# Patient Record
Sex: FEMALE | Race: WHITE | NOT HISPANIC OR LATINO | Employment: PART TIME | URBAN - METROPOLITAN AREA
[De-identification: names, ages, dates, MRNs, and addresses within clinical notes are randomized per-mention and may not be internally consistent; named-entity substitution may affect disease eponyms.]

---

## 2020-05-06 ENCOUNTER — TELEPHONE (OUTPATIENT)
Dept: SCHEDULING | Facility: IMAGING CENTER | Age: 60
End: 2020-05-06

## 2020-06-05 ENCOUNTER — TELEPHONE (OUTPATIENT)
Dept: MEDICAL GROUP | Facility: PHYSICIAN GROUP | Age: 60
End: 2020-06-05

## 2020-06-05 NOTE — TELEPHONE ENCOUNTER
Dr. Ely has informed me that we will need to reschedule patient appointment as she will need to see patient in person and not as virtual visit. I have called and left a message for patient to reschedule appointment for an in person visit.

## 2023-04-08 ENCOUNTER — APPOINTMENT (OUTPATIENT)
Dept: RADIOLOGY | Facility: MEDICAL CENTER | Age: 63
DRG: 640 | End: 2023-04-08
Attending: EMERGENCY MEDICINE
Payer: COMMERCIAL

## 2023-04-08 ENCOUNTER — HOSPITAL ENCOUNTER (INPATIENT)
Facility: MEDICAL CENTER | Age: 63
LOS: 5 days | DRG: 640 | End: 2023-04-13
Attending: EMERGENCY MEDICINE | Admitting: INTERNAL MEDICINE
Payer: COMMERCIAL

## 2023-04-08 DIAGNOSIS — M89.9 LYTIC LESION OF BONE ON X-RAY: ICD-10-CM

## 2023-04-08 DIAGNOSIS — S09.90XA CLOSED HEAD INJURY, INITIAL ENCOUNTER: ICD-10-CM

## 2023-04-08 DIAGNOSIS — E83.42 HYPOMAGNESEMIA: ICD-10-CM

## 2023-04-08 DIAGNOSIS — E87.1 HYPONATREMIA: ICD-10-CM

## 2023-04-08 DIAGNOSIS — M62.82 NON-TRAUMATIC RHABDOMYOLYSIS: ICD-10-CM

## 2023-04-08 DIAGNOSIS — G93.41 ENCEPHALOPATHY, METABOLIC: ICD-10-CM

## 2023-04-08 DIAGNOSIS — G72.9 MYOPATHY: ICD-10-CM

## 2023-04-08 DIAGNOSIS — E87.6 HYPOKALEMIA: ICD-10-CM

## 2023-04-08 DIAGNOSIS — F10.10 ALCOHOL ABUSE: ICD-10-CM
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
## 2023-04-08 PROBLEM — M89.8X9 LYTIC BONE LESIONS ON XRAY: Status: ACTIVE | Noted: 2023-04-08

## 2023-04-08 LAB
ALBUMIN SERPL BCP-MCNC: 4.3 G/DL (ref 3.2–4.9)
ALBUMIN/GLOB SERPL: 1.3 G/DL
ALP SERPL-CCNC: 92 U/L (ref 30–99)
ALT SERPL-CCNC: 64 U/L (ref 2–50)
ANION GAP SERPL CALC-SCNC: 15 MMOL/L (ref 7–16)
ANION GAP SERPL CALC-SCNC: 18 MMOL/L (ref 7–16)
ANION GAP SERPL CALC-SCNC: 19 MMOL/L (ref 7–16)
ANISOCYTOSIS BLD QL SMEAR: ABNORMAL
APPEARANCE UR: CLEAR
APTT PPP: 29.8 SEC (ref 24.7–36)
AST SERPL-CCNC: 203 U/L (ref 12–45)
BACTERIA #/AREA URNS HPF: NEGATIVE /HPF
BASOPHILS # BLD AUTO: 0 % (ref 0–1.8)
BASOPHILS # BLD: 0 K/UL (ref 0–0.12)
BILIRUB SERPL-MCNC: 1.2 MG/DL (ref 0.1–1.5)
BILIRUB UR QL STRIP.AUTO: NEGATIVE
BUN SERPL-MCNC: 14 MG/DL (ref 8–22)
BUN SERPL-MCNC: 18 MG/DL (ref 8–22)
BUN SERPL-MCNC: 19 MG/DL (ref 8–22)
CALCIUM ALBUM COR SERPL-MCNC: 9.4 MG/DL (ref 8.5–10.5)
CALCIUM SERPL-MCNC: 9 MG/DL (ref 8.5–10.5)
CALCIUM SERPL-MCNC: 9.1 MG/DL (ref 8.5–10.5)
CALCIUM SERPL-MCNC: 9.6 MG/DL (ref 8.5–10.5)
CHLORIDE SERPL-SCNC: 62 MMOL/L (ref 96–112)
CHLORIDE SERPL-SCNC: 64 MMOL/L (ref 96–112)
CHLORIDE SERPL-SCNC: <60 MMOL/L (ref 96–112)
CK SERPL-CCNC: 5637 U/L (ref 0–154)
CK SERPL-CCNC: 6875 U/L (ref 0–154)
CO2 SERPL-SCNC: 21 MMOL/L (ref 20–33)
CO2 SERPL-SCNC: 24 MMOL/L (ref 20–33)
CO2 SERPL-SCNC: 24 MMOL/L (ref 20–33)
COLOR UR: YELLOW
CREAT SERPL-MCNC: 0.46 MG/DL (ref 0.5–1.4)
CREAT SERPL-MCNC: 0.61 MG/DL (ref 0.5–1.4)
CREAT SERPL-MCNC: 0.69 MG/DL (ref 0.5–1.4)
DOHLE BOD BLD QL SMEAR: NORMAL
EKG IMPRESSION: NORMAL
EOSINOPHIL # BLD AUTO: 0 K/UL (ref 0–0.51)
EOSINOPHIL NFR BLD: 0 % (ref 0–6.9)
EPI CELLS #/AREA URNS HPF: NEGATIVE /HPF
ERYTHROCYTE [DISTWIDTH] IN BLOOD BY AUTOMATED COUNT: 40.5 FL (ref 35.9–50)
ETHANOL BLD-MCNC: <10.1 MG/DL
GFR SERPLBLD CREATININE-BSD FMLA CKD-EPI: 101 ML/MIN/1.73 M 2
GFR SERPLBLD CREATININE-BSD FMLA CKD-EPI: 108 ML/MIN/1.73 M 2
GFR SERPLBLD CREATININE-BSD FMLA CKD-EPI: 98 ML/MIN/1.73 M 2
GLOBULIN SER CALC-MCNC: 3.4 G/DL (ref 1.9–3.5)
GLUCOSE SERPL-MCNC: 109 MG/DL (ref 65–99)
GLUCOSE SERPL-MCNC: 147 MG/DL (ref 65–99)
GLUCOSE SERPL-MCNC: 95 MG/DL (ref 65–99)
GLUCOSE UR STRIP.AUTO-MCNC: NEGATIVE MG/DL
HCT VFR BLD AUTO: 33.5 % (ref 37–47)
HGB BLD-MCNC: 12.5 G/DL (ref 12–16)
HYALINE CASTS #/AREA URNS LPF: ABNORMAL /LPF
INR PPP: 1.02 (ref 0.87–1.13)
KETONES UR STRIP.AUTO-MCNC: 15 MG/DL
LEUKOCYTE ESTERASE UR QL STRIP.AUTO: NEGATIVE
LG PLATELETS BLD QL SMEAR: NORMAL
LYMPHOCYTES # BLD AUTO: 1.23 K/UL (ref 1–4.8)
LYMPHOCYTES NFR BLD: 8.4 % (ref 22–41)
MAGNESIUM SERPL-MCNC: 1.4 MG/DL (ref 1.5–2.5)
MANUAL DIFF BLD: NORMAL
MCH RBC QN AUTO: 32.5 PG (ref 27–33)
MCHC RBC AUTO-ENTMCNC: 37.3 G/DL (ref 33.6–35)
MCV RBC AUTO: 87 FL (ref 81.4–97.8)
MICRO URNS: ABNORMAL
MICROCYTES BLD QL SMEAR: ABNORMAL
MONOCYTES # BLD AUTO: 1.12 K/UL (ref 0–0.85)
MONOCYTES NFR BLD AUTO: 7.6 % (ref 0–13.4)
MORPHOLOGY BLD-IMP: NORMAL
NEUTROPHILS # BLD AUTO: 12.35 K/UL (ref 2–7.15)
NEUTROPHILS NFR BLD: 84 % (ref 44–72)
NITRITE UR QL STRIP.AUTO: NEGATIVE
NRBC # BLD AUTO: 0 K/UL
NRBC BLD-RTO: 0 /100 WBC
PH UR STRIP.AUTO: 6.5 [PH] (ref 5–8)
PLATELET # BLD AUTO: 283 K/UL (ref 164–446)
PLATELET BLD QL SMEAR: NORMAL
PMV BLD AUTO: 9.7 FL (ref 9–12.9)
POLYCHROMASIA BLD QL SMEAR: NORMAL
POTASSIUM SERPL-SCNC: 2.6 MMOL/L (ref 3.6–5.5)
POTASSIUM SERPL-SCNC: 2.7 MMOL/L (ref 3.6–5.5)
POTASSIUM SERPL-SCNC: 3.4 MMOL/L (ref 3.6–5.5)
PROT SERPL-MCNC: 7.7 G/DL (ref 6–8.2)
PROT UR QL STRIP: 100 MG/DL
PROTHROMBIN TIME: 13.3 SEC (ref 12–14.6)
RBC # BLD AUTO: 3.85 M/UL (ref 4.2–5.4)
RBC # URNS HPF: ABNORMAL /HPF
RBC BLD AUTO: PRESENT
RBC UR QL AUTO: NEGATIVE
SODIUM SERPL-SCNC: 101 MMOL/L (ref 135–145)
SODIUM SERPL-SCNC: 102 MMOL/L (ref 135–145)
SODIUM SERPL-SCNC: 104 MMOL/L (ref 135–145)
SODIUM SERPL-SCNC: 104 MMOL/L (ref 135–145)
SODIUM UR-SCNC: 26 MMOL/L
SP GR UR STRIP.AUTO: 1.02
TOXIC GRANULES BLD QL SMEAR: SLIGHT
UROBILINOGEN UR STRIP.AUTO-MCNC: 1 MG/DL
WBC # BLD AUTO: 14.7 K/UL (ref 4.8–10.8)
WBC #/AREA URNS HPF: ABNORMAL /HPF

## 2023-04-08 PROCEDURE — 85730 THROMBOPLASTIN TIME PARTIAL: CPT

## 2023-04-08 PROCEDURE — 84295 ASSAY OF SERUM SODIUM: CPT

## 2023-04-08 PROCEDURE — 82077 ASSAY SPEC XCP UR&BREATH IA: CPT

## 2023-04-08 PROCEDURE — 99291 CRITICAL CARE FIRST HOUR: CPT | Mod: GC | Performed by: INTERNAL MEDICINE

## 2023-04-08 PROCEDURE — 700102 HCHG RX REV CODE 250 W/ 637 OVERRIDE(OP): Performed by: NURSE PRACTITIONER

## 2023-04-08 PROCEDURE — 700102 HCHG RX REV CODE 250 W/ 637 OVERRIDE(OP): Performed by: INTERNAL MEDICINE

## 2023-04-08 PROCEDURE — 82550 ASSAY OF CK (CPK): CPT | Mod: 91

## 2023-04-08 PROCEDURE — 85025 COMPLETE CBC W/AUTO DIFF WBC: CPT

## 2023-04-08 PROCEDURE — 83735 ASSAY OF MAGNESIUM: CPT

## 2023-04-08 PROCEDURE — 80048 BASIC METABOLIC PNL TOTAL CA: CPT | Mod: 91

## 2023-04-08 PROCEDURE — A9270 NON-COVERED ITEM OR SERVICE: HCPCS | Performed by: STUDENT IN AN ORGANIZED HEALTH CARE EDUCATION/TRAINING PROGRAM

## 2023-04-08 PROCEDURE — 84300 ASSAY OF URINE SODIUM: CPT

## 2023-04-08 PROCEDURE — 700105 HCHG RX REV CODE 258: Performed by: INTERNAL MEDICINE

## 2023-04-08 PROCEDURE — 85610 PROTHROMBIN TIME: CPT

## 2023-04-08 PROCEDURE — 99291 CRITICAL CARE FIRST HOUR: CPT

## 2023-04-08 PROCEDURE — A9270 NON-COVERED ITEM OR SERVICE: HCPCS | Performed by: INTERNAL MEDICINE

## 2023-04-08 PROCEDURE — 770022 HCHG ROOM/CARE - ICU (200)

## 2023-04-08 PROCEDURE — 83930 ASSAY OF BLOOD OSMOLALITY: CPT

## 2023-04-08 PROCEDURE — 80053 COMPREHEN METABOLIC PANEL: CPT

## 2023-04-08 PROCEDURE — 700102 HCHG RX REV CODE 250 W/ 637 OVERRIDE(OP): Performed by: STUDENT IN AN ORGANIZED HEALTH CARE EDUCATION/TRAINING PROGRAM

## 2023-04-08 PROCEDURE — 72125 CT NECK SPINE W/O DYE: CPT

## 2023-04-08 PROCEDURE — A9270 NON-COVERED ITEM OR SERVICE: HCPCS | Performed by: NURSE PRACTITIONER

## 2023-04-08 PROCEDURE — 700101 HCHG RX REV CODE 250: Performed by: EMERGENCY MEDICINE

## 2023-04-08 PROCEDURE — 71045 X-RAY EXAM CHEST 1 VIEW: CPT

## 2023-04-08 PROCEDURE — 70450 CT HEAD/BRAIN W/O DYE: CPT

## 2023-04-08 PROCEDURE — 85007 BL SMEAR W/DIFF WBC COUNT: CPT

## 2023-04-08 PROCEDURE — 81001 URINALYSIS AUTO W/SCOPE: CPT

## 2023-04-08 PROCEDURE — 83935 ASSAY OF URINE OSMOLALITY: CPT

## 2023-04-08 PROCEDURE — 93005 ELECTROCARDIOGRAM TRACING: CPT | Performed by: STUDENT IN AN ORGANIZED HEALTH CARE EDUCATION/TRAINING PROGRAM

## 2023-04-08 PROCEDURE — 700111 HCHG RX REV CODE 636 W/ 250 OVERRIDE (IP): Performed by: STUDENT IN AN ORGANIZED HEALTH CARE EDUCATION/TRAINING PROGRAM

## 2023-04-08 RX ORDER — MAGNESIUM SULFATE HEPTAHYDRATE 40 MG/ML
4 INJECTION, SOLUTION INTRAVENOUS ONCE
Status: COMPLETED | OUTPATIENT
Start: 2023-04-08 | End: 2023-04-08

## 2023-04-08 RX ORDER — POTASSIUM CHLORIDE 7.45 MG/ML
10 INJECTION INTRAVENOUS
Status: COMPLETED | OUTPATIENT
Start: 2023-04-08 | End: 2023-04-08

## 2023-04-08 RX ORDER — AMOXICILLIN 250 MG
2 CAPSULE ORAL 2 TIMES DAILY
Status: DISCONTINUED | OUTPATIENT
Start: 2023-04-08 | End: 2023-04-13 | Stop reason: HOSPADM

## 2023-04-08 RX ORDER — ASPIRIN 325 MG
650 TABLET ORAL EVERY 6 HOURS PRN
COMMUNITY

## 2023-04-08 RX ORDER — DIPHENHYDRAMINE HCL 25 MG
25 TABLET ORAL NIGHTLY PRN
Status: DISCONTINUED | OUTPATIENT
Start: 2023-04-08 | End: 2023-04-09

## 2023-04-08 RX ORDER — POTASSIUM CHLORIDE 20 MEQ/1
20 TABLET, EXTENDED RELEASE ORAL ONCE
Status: COMPLETED | OUTPATIENT
Start: 2023-04-08 | End: 2023-04-08

## 2023-04-08 RX ORDER — ALBUTEROL SULFATE 90 UG/1
2 AEROSOL, METERED RESPIRATORY (INHALATION) EVERY 4 HOURS PRN
COMMUNITY

## 2023-04-08 RX ORDER — POLYETHYLENE GLYCOL 3350 17 G/17G
1 POWDER, FOR SOLUTION ORAL
Status: DISCONTINUED | OUTPATIENT
Start: 2023-04-08 | End: 2023-04-13 | Stop reason: HOSPADM

## 2023-04-08 RX ORDER — ACETAMINOPHEN 325 MG/1
650 TABLET ORAL EVERY 6 HOURS PRN
Status: DISCONTINUED | OUTPATIENT
Start: 2023-04-08 | End: 2023-04-13 | Stop reason: HOSPADM

## 2023-04-08 RX ORDER — SODIUM CHLORIDE, SODIUM LACTATE, POTASSIUM CHLORIDE, CALCIUM CHLORIDE 600; 310; 30; 20 MG/100ML; MG/100ML; MG/100ML; MG/100ML
INJECTION, SOLUTION INTRAVENOUS CONTINUOUS
Status: DISCONTINUED | OUTPATIENT
Start: 2023-04-08 | End: 2023-04-08

## 2023-04-08 RX ORDER — BUDESONIDE AND FORMOTEROL FUMARATE DIHYDRATE 160; 4.5 UG/1; UG/1
2 AEROSOL RESPIRATORY (INHALATION) 2 TIMES DAILY
COMMUNITY

## 2023-04-08 RX ORDER — POTASSIUM CHLORIDE 20 MEQ/1
40 TABLET, EXTENDED RELEASE ORAL ONCE
Status: COMPLETED | OUTPATIENT
Start: 2023-04-08 | End: 2023-04-08

## 2023-04-08 RX ORDER — ESCITALOPRAM OXALATE 20 MG/1
20 TABLET ORAL DAILY
COMMUNITY

## 2023-04-08 RX ORDER — SODIUM CHLORIDE 450 MG/100ML
INJECTION, SOLUTION INTRAVENOUS CONTINUOUS
Status: DISCONTINUED | OUTPATIENT
Start: 2023-04-08 | End: 2023-04-09

## 2023-04-08 RX ORDER — BISACODYL 10 MG
10 SUPPOSITORY, RECTAL RECTAL
Status: DISCONTINUED | OUTPATIENT
Start: 2023-04-08 | End: 2023-04-13 | Stop reason: HOSPADM

## 2023-04-08 RX ADMIN — POTASSIUM CHLORIDE 10 MEQ: 7.46 INJECTION, SOLUTION INTRAVENOUS at 18:40

## 2023-04-08 RX ADMIN — RIVAROXABAN 10 MG: 10 TABLET, FILM COATED ORAL at 17:26

## 2023-04-08 RX ADMIN — SODIUM CHLORIDE: 4.5 INJECTION, SOLUTION INTRAVENOUS at 17:29

## 2023-04-08 RX ADMIN — POTASSIUM CHLORIDE 40 MEQ: 1500 TABLET, EXTENDED RELEASE ORAL at 17:26

## 2023-04-08 RX ADMIN — POTASSIUM CHLORIDE 10 MEQ: 7.46 INJECTION, SOLUTION INTRAVENOUS at 17:37

## 2023-04-08 RX ADMIN — DIPHENHYDRAMINE HYDROCHLORIDE 25 MG: 25 TABLET ORAL at 20:55

## 2023-04-08 RX ADMIN — POTASSIUM CHLORIDE 20 MEQ: 1500 TABLET, EXTENDED RELEASE ORAL at 23:31

## 2023-04-08 RX ADMIN — MAGNESIUM SULFATE HEPTAHYDRATE 4 G: 40 INJECTION, SOLUTION INTRAVENOUS at 17:31

## 2023-04-08 RX ADMIN — POTASSIUM CHLORIDE 10 MEQ: 7.46 INJECTION, SOLUTION INTRAVENOUS at 20:49

## 2023-04-08 RX ADMIN — THIAMINE HYDROCHLORIDE: 100 INJECTION, SOLUTION INTRAMUSCULAR; INTRAVENOUS at 14:46

## 2023-04-08 RX ADMIN — POTASSIUM CHLORIDE 10 MEQ: 7.46 INJECTION, SOLUTION INTRAVENOUS at 19:47

## 2023-04-08 RX ADMIN — SENNOSIDES AND DOCUSATE SODIUM 2 TABLET: 50; 8.6 TABLET ORAL at 17:26

## 2023-04-08 ASSESSMENT — ENCOUNTER SYMPTOMS
BLURRED VISION: 0
LOSS OF CONSCIOUSNESS: 0
CHILLS: 0
PALPITATIONS: 0
NAUSEA: 0
HEADACHES: 0
NECK PAIN: 0
VOMITING: 0
SENSORY CHANGE: 0
FEVER: 0
FOCAL WEAKNESS: 0
SHORTNESS OF BREATH: 0
COUGH: 0
DIZZINESS: 0
ABDOMINAL PAIN: 0
SPEECH CHANGE: 0

## 2023-04-08 ASSESSMENT — LIFESTYLE VARIABLES: SUBSTANCE_ABUSE: 1

## 2023-04-08 NOTE — H&P
[facility] Internal Medicine History & Physical Note    Date of Service  4/8/2023    [facility] Team: [facility] ICU Gold Team   Attending: Sheryl Townsend D.o.  Senior Resident: Dr. Barnard    Contact Number: [phone]    Primary Care Physician  Pcp Pt States None    Consultants  critical care    Specialist Names: Dr. Townsend    Code Status  Full Code    Chief Complaint  Chief Complaint   Patient presents with    GLF       History of Presenting Illness (HPI):   Patient is a 62 y.o. female w/ PMH of L4-L5 fusion 2/2 DDD, bilateral total knee replacements, left femur fracture of unknown cause s/p intramedullary nailing, bilateral foot fractures of unknown cause s/p intramedullary nailing, who presented to the ED on 4/8/2023 with GLF.    Patient states she was trying to get out of bed at night, but fell and couldn't get up. She denied any prodromes before the fall and any LOC or head trauma after the fall. She states she fell on her back. She states she had problems moving due to her extensive past surgical history of L4-L5 spine fusion w/ diminished spine mobility, bilateral total knee replacements, and left femur fracture s/p intramedullary nailing, and bilateral foot fractures s/p intramedullary nailing. She was down for 2 days and could not get up so she called a friend who eventually called EMS. She denies any symptoms currently including dizziness, headache, blurry vision, muscle weakness, change in muscle sensation, chest pain, palpitations, SOB, cough, abdominal pain, nausea, and vomiting. She denies any past medical history, denies taking any medications, denies taking any NSAIDs nor blood thinners, denies any family history of cancers.    She denies ever smoking, drinks 2-3x alcoholic beverages per week, but hasn't drank for the past couple of weeks. Occasionally smokes Marijuana, but not daily. She lives alone and works part time as a  at [employer].    In the ED, VSS on RA  Na 102, K 2.7, Cl <60, Co2 24, AG 18  Cr  0.69, Mg 1.4, CPK 6875  CXR (4/8/23): Expansile lytic lesion in the right humeral shaft  CT head w/o (4/8/23): No acute cerebral infarction, hemorrhage or mass lesion.  CT spine w/o (4/8/23): Degenerative change without evidence of cervical spine fracture  S/p banana bag in the ED    I discussed the plan of care with patient, family, bedside RN, charge RN, and pharmacy.    Review of Systems  Review of Systems   Constitutional:  Negative for chills, fever and malaise/fatigue.   Eyes:  Negative for blurred vision.   Respiratory:  Negative for cough and shortness of breath.    Cardiovascular:  Negative for chest pain and palpitations.   Gastrointestinal:  Negative for abdominal pain, nausea and vomiting.   Genitourinary:  Negative for dysuria.   Musculoskeletal:  Negative for neck pain.   Skin:  Negative for rash.   Neurological:  Negative for dizziness, sensory change, speech change, focal weakness, loss of consciousness and headaches.   Psychiatric/Behavioral:  Positive for substance abuse.    All other systems reviewed and are negative.    Past Medical History  Denies any PMHx    Surgical History  L4-L5 fusion, bilateral knee replacements, intramedullary nailing of left femur, intramedullary nailing of bilateral foot    Family History  Denies any family hx of malignancy  Family history reviewed with patient.     Social History  Tobacco: Please see HPI  Alcohol: Please see HPI  Recreational drugs (illegal or prescription): Please see HPI  Employment: Please see HPI  Living Situation: Please see HPI  Recent Travel: Please see HPI  Primary Care Provider: Reviewed    Allergies  No Known Allergies    Medications  None       Physical Exam  Temp:  [36.7 °C (98 °F)-36.8 °C (98.2 °F)] 36.7 °C (98 °F)  Pulse:  [82-91] 91  Resp:  [18-20] 20  BP: (122-145)/(59-75) 122/75  SpO2:  [95 %-97 %] 95 %  Blood Pressure: 131/74   Temperature: 36.8 °C (98.2 °F)   Pulse: 89   Respiration: 18   Pulse Oximetry: 97 %       Physical  Exam  Vitals and nursing note reviewed.   Constitutional:       Appearance: Normal appearance.   HENT:      Head: Normocephalic and atraumatic.      Comments: Ecchymosis around forehead and left periorbital region     Right Ear: External ear normal.      Left Ear: External ear normal.      Nose: Nose normal.      Mouth/Throat:      Mouth: Mucous membranes are dry.      Pharynx: Oropharynx is clear.   Eyes:      Extraocular Movements: Extraocular movements intact.      Pupils: Pupils are equal, round, and reactive to light.   Cardiovascular:      Rate and Rhythm: Normal rate and regular rhythm.      Pulses: Normal pulses.      Heart sounds: Normal heart sounds. No murmur heard.    No friction rub. No gallop.   Pulmonary:      Effort: Pulmonary effort is normal. No respiratory distress.      Breath sounds: Normal breath sounds. No stridor. No wheezing, rhonchi or rales.   Abdominal:      General: There is no distension.      Palpations: Abdomen is soft.      Tenderness: There is no abdominal tenderness. There is no guarding or rebound.   Musculoskeletal:         General: Normal range of motion.      Cervical back: Normal range of motion.   Skin:     General: Skin is warm.      Capillary Refill: Capillary refill takes less than 2 seconds.      Coloration: Skin is not jaundiced.      Comments: Scattered ecchymosis in the bilateral LE   Neurological:      General: No focal deficit present.      Mental Status: She is alert and oriented to person, place, and time.      Cranial Nerves: No cranial nerve deficit.      Sensory: No sensory deficit.      Coordination: Coordination abnormal (mild dysmetria on bilateral UE, normal heel to shin).       Laboratory:      Recent Labs     04/08/23  1330 04/08/23  1559   SODIUM 102* 101*   POTASSIUM 2.7* 2.6*   CHLORIDE <60* 62*   CO2 24 24   GLUCOSE 109* 147*   BUN 19 18   CREATININE 0.69 0.61   CALCIUM 9.6 9.1     Recent Labs     04/08/23  1330 04/08/23  1559   ALTSGPT 64*  --     ASTSGOT 203*  --    ALKPHOSPHAT 92  --    TBILIRUBIN 1.2  --    GLUCOSE 109* 147*     Recent Labs     04/08/23  1330   APTT 29.8   INR 1.02     No results for input(s): NTPROBNP in the last 72 hours.      No results for input(s): TROPONINT in the last 72 hours.    Imaging:  CT-CSPINE WITHOUT PLUS RECONS   Final Result      Degenerative change without evidence of cervical spine fracture.      CT-HEAD W/O   Final Result      Head CT without contrast within normal limits. No evidence of acute cerebral infarction, hemorrhage or mass lesion.         DX-CHEST-PORTABLE (1 VIEW)   Final Result      1.  No acute cardiopulmonary disease.   2.  Expansile lytic lesion in the RIGHT humeral shaft, of uncertain etiology.          X-Ray:  I have personally reviewed the images and compared with prior images.  EKG:  I have personally reviewed the images and compared with prior images.    Assessment/Plan:  Problem Representation:   I anticipate this patient will require at least two midnights for appropriate medical management, necessitating inpatient admission because of management of severe hyponatremia    Patient will need a ICU (Adult and Pediatrics) bed on MEDICAL service .  The need is secondary to as above.    * Hyponatremia- (present on admission)  Assessment & Plan  *Na 102, K 2.7, Cl <60  *Serum Osmo 217  *Hypovolemic Hypotonic Hyponatremia    -Admit to ICU  -Telemetry monitoring  -Replete Na w/  ml/hr  -q4h Na+ labs, goal to correct NA 8-12 mEq/L in 24 hour period  -Serum osmolality,  urine osmolality, urine sodium to help determine etiology    Rhabdomyolysis  Assessment & Plan  *CPK 6875  *2/2 GLF w/ prolonged immobility and dehydration    -Continue w/ conservative IVF resuscitation,  ml/hr    Lytic bone lesions on xray  Assessment & Plan  *CXR (4/8/23): Expansile lytic lesion in the right humeral shaft  *Differentials include malignancy    -Consider Pan CT w/ contrast after resolution of  rhabdomyolysis    Ground-level fall  Assessment & Plan  *Denies LOC/head trauma  *CT head w/o (4/8/23): No acute cerebral infarction, hemorrhage or mass lesion.  CT spine w/o (4/8/23): Degenerative change without evidence of cervical spine fracture    -PT/OT    Hypomagnesemia  Assessment & Plan  *Mg 1.4    -Replete as needed    Hypokalemia  Assessment & Plan  *K 2.7    -Replete as needed        VTE prophylaxis: Xarelto 10 mg daily as prophylaxis

## 2023-04-08 NOTE — ED NOTES
Med rec updated and complete. Allergies reviewed. Confirmed name and date of birth. Pt denies antibiotic use in last 30 days.      Home pharmacy [facility] [phone]

## 2023-04-08 NOTE — ED NOTES
Anm from Lab called with critical result of Potasium 2.7,  and CPK 6875 at 1519. Critical lab result read back to Anm   Dr. Ye notified of critical lab result at 1520 via volt text

## 2023-04-08 NOTE — ED NOTES
Patient return back to [facility] from imaging, charge nurse requested for revising deposition for critical labs, advised to transfer pt to [facility]

## 2023-04-08 NOTE — ED NOTES
Patient report given to REID Covarrubias. Transferred to [facility] on [facility].

## 2023-04-08 NOTE — ED PROVIDER NOTES
"ED Provider Note    CHIEF COMPLAINT  Chief Complaint   Patient presents with    GLF     EXTERNAL RECORDS REVIEWED  No recent records available.    HPI/ROS  LIMITATION TO HISTORY   Select: : None  OUTSIDE HISTORIAN(S):  None    Farzana Fragoso is a 62 y.o. female who presents to the Emergency Department for evaluation after a fall onset two days ago. Patient reports that she was trying to get out of bed when she slipped, causing the fall. Patient states that she hit her head on the ground, but denies any loss of consciousness . She was unable to get up due to generalized weakness, and was reportedly lying on the floor for two days. Patient was finally able to get in touch with friend who contacted EMS, who brought her in today. She is now experiencing associated headache, but denies any neck pain, back pain, or extremity pain. Patient initially said that she wasn't on blood thinners, but later states that she takes aspirin regularly for pain with her last dose being this morning. Patient admits to drinking alcohol regularly with her last drink being a couple of weeks ago.     PAST MEDICAL HISTORY  None reported.    SURGICAL HISTORY  None pertinent.    FAMILY HISTORY  None noted at this encounter.    SOCIAL HISTORY       CURRENT MEDICATIONS  Current Discharge Medication List        CONTINUE these medications which have NOT CHANGED    Details   escitalopram (LEXAPRO) 20 MG tablet Take 20 mg by mouth every day.      aspirin (ASA) 325 MG Tab Take 650 mg by mouth every 6 hours as needed.      albuterol 108 (90 Base) MCG/ACT Aero Soln inhalation aerosol Inhale 2 Puffs every four hours as needed for Shortness of Breath.      budesonide-formoterol (SYMBICORT) 160-4.5 MCG/ACT Aerosol Inhale 2 Puffs 2 times a day.             ALLERGIES  Patient has no allergy information on record.    PHYSICAL EXAM  /74   Pulse 89   Temp 36.8 °C (98.2 °F) (Temporal)   Resp 18   Ht 1.707 m (5' 7.2\")   Wt 74.8 kg (165 lb)   SpO2 97%  "   Constitutional: Chronically ill appearing. Alert in no apparent distress.  HENT: Bilateral external ears normal. Nose normal.  Moist mucous membranes.  Oropharynx clear. Multiple bruises to forehead and around the left eye.  Eyes: Pupils are equal and reactive. Conjunctiva normal.   Neck: Supple, full range of motion  Heart: Regular rate and rhythm.  No murmurs.    Lungs: No respiratory distress, normal work of breathing. Lungs clear to auscultation bilaterally.  Abdomen Soft, no distention.  No tenderness to palpation.  Musculoskeletal: No obvious deformities noted.  No lower extremity edema. Scattered brusing on all extremities, primarily over right shoulder, right elbow, and bilateral knees.   Skin: Warm, Dry.  No erythema, No rash. Small skin tear over right elbow.  Neurologic: Alert and oriented x3. Moving all extremities spontaneously without focal deficits.  Psychiatric: Affect normal, Mood normal, Appears appropriate and not intoxicated.     DIAGNOSTIC STUDIES / PROCEDURES    LABS  Labs Reviewed   COMP METABOLIC PANEL - Abnormal; Notable for the following components:       Result Value    Sodium 102 (*)     Potassium 2.7 (*)     Chloride <60 (*)     Anion Gap 18.0 (*)     Glucose 109 (*)     AST(SGOT) 203 (*)     ALT(SGPT) 64 (*)     All other components within normal limits   CREATINE KINASE - Abnormal; Notable for the following components:    CPK Total 6875 (*)     All other components within normal limits   MAGNESIUM - Abnormal; Notable for the following components:    Magnesium 1.4 (*)     All other components within normal limits   URINALYSIS,CULTURE IF INDICATED - Abnormal; Notable for the following components:    Ketones 15 (*)     Protein 100 (*)     All other components within normal limits    Narrative:     Osmolality, Urine, test code [id number].  Transport: 0.5-1 mL Urine,  refrigerated.   BASIC METABOLIC PANEL - Abnormal; Notable for the following components:    Sodium 101 (*)     Potassium 2.6  (*)     Chloride 62 (*)     Glucose 147 (*)     All other components within normal limits   CBC WITH DIFFERENTIAL - Abnormal; Notable for the following components:    WBC 14.7 (*)     RBC 3.85 (*)     Hematocrit 33.5 (*)     MCHC 37.3 (*)     Neutrophils-Polys 84.00 (*)     Lymphocytes 8.40 (*)     Neutrophils (Absolute) 12.35 (*)     Monos (Absolute) 1.12 (*)     Microcytosis 2+ (*)     All other components within normal limits   URINE MICROSCOPIC (W/UA) - Abnormal; Notable for the following components:    Hyaline Cast 3-5 (*)     All other components within normal limits    Narrative:     Osmolality, Urine, test code [id number].  Transport: 0.5-1 mL Urine,  refrigerated.   DIAGNOSTIC ALCOHOL   PROTHROMBIN TIME   APTT   CORRECTED CALCIUM   ESTIMATED GFR   URINE SODIUM RANDOM    Narrative:     Osmolality, Urine, test code [id number].  Transport: 0.5-1 mL Urine,  refrigerated.   ESTIMATED GFR   DIFFERENTIAL MANUAL   PERIPHERAL SMEAR REVIEW   PLATELET ESTIMATE   MORPHOLOGY   BASIC METABOLIC PANEL   REFERENCE MISC.REFRIG 1    Narrative:     Osmolality, Serum, test code [id number].  Transport: 0.5-1 mL serum,  refrigerated.   TSH WITH REFLEX TO FT4   REFERENCE MISC.REFRIG 1    Narrative:     Osmolality, Urine, test code [id number].  Transport: 0.5-1 mL Urine,  refrigerated.   SODIUM SERUM (NA)   CREATINE KINASE   CREATINE KINASE   BASIC METABOLIC PANEL   SODIUM SERUM (NA)        RADIOLOGY  I have independently interpreted the diagnostic imaging associated with this visit and am waiting the final reading from the radiologist.   My preliminary interpretation is a follows: no intracranial hemorrhage  Radiologist interpretation:   CT-CSPINE WITHOUT PLUS RECONS   Final Result      Degenerative change without evidence of cervical spine fracture.      CT-HEAD W/O   Final Result      Head CT without contrast within normal limits. No evidence of acute cerebral infarction, hemorrhage or mass lesion.         DX-CHEST-PORTABLE (1 VIEW)   Final  Result      1.  No acute cardiopulmonary disease.   2.  Expansile lytic lesion in the RIGHT humeral shaft, of uncertain etiology.           COURSE & MEDICAL DECISION MAKING  1:57 PM - Patient seen and examined at bedside. Discussed plan of care, including ordering lab work and imaging. Patient agrees to the plan of care. The patient will be medicated with Detox IV 1000 mL. Ordered for Corrected calcium, APTT, prothrombin time, Diagnostic alcohol, UA, Magnesium, Creatine kinase, CMP, CBC with differential, CT-Head, DX-Chest, and CT-C spine without plus recons to evaluate her symptoms.      ED Observation Status? Yes; I am placing the patient in to an observation status due to a diagnostic uncertainty as well as therapeutic intensity. Patient placed in observation status at 1:57 PM, 4/8/2023.     Observation plan is as follows: Reevaluate patient after lab work and imaging is completed.    Upon Reevaluation, the patient's condition has: not improved; and will be escalated to hospitalization.    INITIAL ASSESSMENT, COURSE AND PLAN  Care Narrative: Patient with no reported medical history although does appear to use alcohol regularly who presents after a ground-level fall which occurred 2 days ago.  The patient was too weak to get up and has been lying on the floor since that time.  She is alert with normal vital signs on arrival.  She has no focal neurologic deficits concerning for stroke.  She does have multiple areas of bruising on the head and body that would suggest that she has been falling frequently.  Imaging does not show evidence of intracranial hemorrhage, skull fracture, cervical spine fracture.  Patient has severe electrolyte derangements with a sodium of 102, potassium of 2.7 as well as low magnesium.  She received approximately 300 cc of a detox bag prior to these results returning.  Fluids were stopped and labs were rechecked, showing sodium actually decreased to 101.  Patient remains neurologically  intact.  We will plan to admit to the ICU for close monitoring and electrolyte repletion.  Urine lites are pending at this time.  She also has evidence of mild rhabdomyolysis with a CK of 6000 and will need to be hydrated for this as well.  She has mild leukocytosis however no signs of sepsis or infectious process, urinalysis negative for infection.  Chest x-ray without pneumonia or pulmonary edema.  She does have a lytic lesion of the right humerus which likely needs further evaluation as well.    4:54 PM - Upon reassessment, patient is resting comfortably with normal vital signs.  No new complaints at this time.  Discussed admission with the patient. Patient verbalizes understanding and agreement to this plan of care.      ADDITIONAL PROBLEM LIST  Problem #1: Severe hyponatremia -given a small bolus of IV fluids approximately 300 cc prior to labs returning, repeat showed slight decrease, will plan to admit for further slow repletion    Problem #2: Acute rhabdomyolysis - admit for hydration and monitoring    Problem #3: Hypokalemia -admit for repletion    Problem #4: Hypomagnesemia -admit for repletion    Problem #5: Lytic lesion of the left shoulder - likely needs further evaluation with CT of the chest abdomen pelvis as an inpatient    Problem #6: Transaminitis - likely related to alcohol use    DISPOSITION AND DISCUSSIONS  I have discussed management of the patient with the following physicians and KRISTEN's:  Dr. Townsend    3:37 PM I discussed the patient's case and the above findings with Dr. Townsend (Pulmonary) who agrees to admit the patient     Discussion of management with other [facility] or appropriate source(s): Pharmacy regarding fluid repletion        CRITICAL CARE TIME  Upon my evaluation, this patient had a high probability of imminent or life-threatening deterioration due to severe hyponatremia, hypokalemia, hypomagnesiemia which required my direct attention, intervention, and personal management.      I personally provided 35 minutes of total critical care time outside of time spent on separately billable/documented procedures. Time includes: review of laboratory data, review of radiology studies, discussion with consultants, discussion with family/patient, monitoring for potential decompensation.  Interventions were performed as documented above.      DISPOSITION:  Patient will be hospitalized by Dr. Townsend in critical condition.      FINAL DIAGNOSIS  1. Hyponatremia    2. Non-traumatic rhabdomyolysis    3. Closed head injury, initial encounter    4. Alcohol abuse    5. Lytic lesion of bone on x-ray    6. Hypokalemia    7. Hypomagnesemia        The note accurately reflects work and decisions made by me.  Jazmín West M.D.  4/8/2023  9:06 PM     Dano ROYAL (Scribe), am scribing for, and in the presence of, Jazmín West M.D..    Electronically signed by: Dano Noyola (To), 4/8/2023    Jazmín ROYAL M.D. personally performed the services described in this documentation, as scribed by Dano Noyola in my presence, and it is both accurate and complete.

## 2023-04-08 NOTE — ED TRIAGE NOTES
"Chief Complaint   Patient presents with    GLF     BIB EMS to [facility] with above complain pickup from home, As per EMS pt's friend called EMS. On scene pt was on floor, FS 123mg/dl 98% on room air , AAOX4. As per pt she fell 2 days ago following loss of balance strike her head on ground and been on floor and text friend last night for help.     As per pt -ve LOC, -ve nausea/vomiting, -ve blood thinner, pt can recall what happened. Pt endorses she drinks alcohol but haven't drink for couple of weeks. Denied use of any drugs    Pt has bruises on forehead, AAO  X4 at this time.     pt on monitor and in gown, labs drawn and sent.     BP (!) 145/69   Pulse 82   Temp 36.8 °C (98.2 °F) (Temporal)   Resp 18   Ht 1.707 m (5' 7.2\")   Wt 74.8 kg (165 lb)   SpO2 97%   BMI 25.69 kg/m²       "

## 2023-04-09 LAB
ALBUMIN SERPL BCP-MCNC: 3.5 G/DL (ref 3.2–4.9)
ALBUMIN SERPL BCP-MCNC: 3.6 G/DL (ref 3.2–4.9)
ALBUMIN/GLOB SERPL: 1.1 G/DL
ALBUMIN/GLOB SERPL: 1.2 G/DL
ALP SERPL-CCNC: 74 U/L (ref 30–99)
ALP SERPL-CCNC: 75 U/L (ref 30–99)
ALT SERPL-CCNC: 56 U/L (ref 2–50)
ALT SERPL-CCNC: 58 U/L (ref 2–50)
ANION GAP SERPL CALC-SCNC: 14 MMOL/L (ref 7–16)
ANION GAP SERPL CALC-SCNC: 15 MMOL/L (ref 7–16)
ANION GAP SERPL CALC-SCNC: 16 MMOL/L (ref 7–16)
AST SERPL-CCNC: 165 U/L (ref 12–45)
AST SERPL-CCNC: 177 U/L (ref 12–45)
B-OH-BUTYR SERPL-MCNC: 1.82 MMOL/L (ref 0.02–0.27)
BASOPHILS # BLD AUTO: 0.2 % (ref 0–1.8)
BASOPHILS # BLD: 0.02 K/UL (ref 0–0.12)
BILIRUB SERPL-MCNC: 0.9 MG/DL (ref 0.1–1.5)
BILIRUB SERPL-MCNC: 0.9 MG/DL (ref 0.1–1.5)
BUN SERPL-MCNC: 13 MG/DL (ref 8–22)
BUN SERPL-MCNC: 13 MG/DL (ref 8–22)
BUN SERPL-MCNC: 9 MG/DL (ref 8–22)
CALCIUM ALBUM COR SERPL-MCNC: 8.5 MG/DL (ref 8.5–10.5)
CALCIUM ALBUM COR SERPL-MCNC: 8.9 MG/DL (ref 8.5–10.5)
CALCIUM SERPL-MCNC: 8.2 MG/DL (ref 8.5–10.5)
CALCIUM SERPL-MCNC: 8.5 MG/DL (ref 8.5–10.5)
CALCIUM SERPL-MCNC: 8.6 MG/DL (ref 8.5–10.5)
CHLORIDE SERPL-SCNC: 67 MMOL/L (ref 96–112)
CHLORIDE SERPL-SCNC: 72 MMOL/L (ref 96–112)
CHLORIDE SERPL-SCNC: 84 MMOL/L (ref 96–112)
CK SERPL-CCNC: 3280 U/L (ref 0–154)
CK SERPL-CCNC: 4029 U/L (ref 0–154)
CO2 SERPL-SCNC: 21 MMOL/L (ref 20–33)
CO2 SERPL-SCNC: 21 MMOL/L (ref 20–33)
CO2 SERPL-SCNC: 22 MMOL/L (ref 20–33)
CREAT SERPL-MCNC: 0.31 MG/DL (ref 0.5–1.4)
CREAT SERPL-MCNC: 0.38 MG/DL (ref 0.5–1.4)
CREAT SERPL-MCNC: 0.45 MG/DL (ref 0.5–1.4)
EOSINOPHIL # BLD AUTO: 0.06 K/UL (ref 0–0.51)
EOSINOPHIL NFR BLD: 0.5 % (ref 0–6.9)
ERYTHROCYTE [DISTWIDTH] IN BLOOD BY AUTOMATED COUNT: 40.5 FL (ref 35.9–50)
GFR SERPLBLD CREATININE-BSD FMLA CKD-EPI: 108 ML/MIN/1.73 M 2
GFR SERPLBLD CREATININE-BSD FMLA CKD-EPI: 113 ML/MIN/1.73 M 2
GFR SERPLBLD CREATININE-BSD FMLA CKD-EPI: 118 ML/MIN/1.73 M 2
GLOBULIN SER CALC-MCNC: 3 G/DL (ref 1.9–3.5)
GLOBULIN SER CALC-MCNC: 3.2 G/DL (ref 1.9–3.5)
GLUCOSE SERPL-MCNC: 121 MG/DL (ref 65–99)
GLUCOSE SERPL-MCNC: 84 MG/DL (ref 65–99)
GLUCOSE SERPL-MCNC: 88 MG/DL (ref 65–99)
HCT VFR BLD AUTO: 35.2 % (ref 37–47)
HGB BLD-MCNC: 13.4 G/DL (ref 12–16)
IMM GRANULOCYTES # BLD AUTO: 0.11 K/UL (ref 0–0.11)
IMM GRANULOCYTES NFR BLD AUTO: 0.9 % (ref 0–0.9)
LACTATE SERPL-SCNC: 0.8 MMOL/L (ref 0.5–2)
LYMPHOCYTES # BLD AUTO: 1.4 K/UL (ref 1–4.8)
LYMPHOCYTES NFR BLD: 11.6 % (ref 22–41)
MAGNESIUM SERPL-MCNC: 2.4 MG/DL (ref 1.5–2.5)
MCH RBC QN AUTO: 33.6 PG (ref 27–33)
MCHC RBC AUTO-ENTMCNC: 37.4 G/DL (ref 33.6–35)
MCV RBC AUTO: 88.2 FL (ref 81.4–97.8)
MONOCYTES # BLD AUTO: 1.09 K/UL (ref 0–0.85)
MONOCYTES NFR BLD AUTO: 9 % (ref 0–13.4)
NEUTROPHILS # BLD AUTO: 9.39 K/UL (ref 2–7.15)
NEUTROPHILS NFR BLD: 77.8 % (ref 44–72)
NRBC # BLD AUTO: 0 K/UL
NRBC BLD-RTO: 0 /100 WBC
PHOSPHATE SERPL-MCNC: 2.3 MG/DL (ref 2.5–4.5)
PLATELET # BLD AUTO: 240 K/UL (ref 164–446)
PMV BLD AUTO: 9.9 FL (ref 9–12.9)
POTASSIUM SERPL-SCNC: 3.7 MMOL/L (ref 3.6–5.5)
POTASSIUM SERPL-SCNC: 3.7 MMOL/L (ref 3.6–5.5)
POTASSIUM SERPL-SCNC: 3.9 MMOL/L (ref 3.6–5.5)
PROT SERPL-MCNC: 6.6 G/DL (ref 6–8.2)
PROT SERPL-MCNC: 6.7 G/DL (ref 6–8.2)
RBC # BLD AUTO: 3.99 M/UL (ref 4.2–5.4)
SODIUM SERPL-SCNC: 105 MMOL/L (ref 135–145)
SODIUM SERPL-SCNC: 108 MMOL/L (ref 135–145)
SODIUM SERPL-SCNC: 110 MMOL/L (ref 135–145)
SODIUM SERPL-SCNC: 113 MMOL/L (ref 135–145)
SODIUM SERPL-SCNC: 118 MMOL/L (ref 135–145)
SODIUM SERPL-SCNC: 119 MMOL/L (ref 135–145)
WBC # BLD AUTO: 12.1 K/UL (ref 4.8–10.8)

## 2023-04-09 PROCEDURE — 83605 ASSAY OF LACTIC ACID: CPT

## 2023-04-09 PROCEDURE — 700111 HCHG RX REV CODE 636 W/ 250 OVERRIDE (IP): Performed by: INTERNAL MEDICINE

## 2023-04-09 PROCEDURE — 99292 CRITICAL CARE ADDL 30 MIN: CPT | Performed by: INTERNAL MEDICINE

## 2023-04-09 PROCEDURE — 99291 CRITICAL CARE FIRST HOUR: CPT | Performed by: STUDENT IN AN ORGANIZED HEALTH CARE EDUCATION/TRAINING PROGRAM

## 2023-04-09 PROCEDURE — A9270 NON-COVERED ITEM OR SERVICE: HCPCS | Performed by: STUDENT IN AN ORGANIZED HEALTH CARE EDUCATION/TRAINING PROGRAM

## 2023-04-09 PROCEDURE — 700102 HCHG RX REV CODE 250 W/ 637 OVERRIDE(OP): Performed by: INTERNAL MEDICINE

## 2023-04-09 PROCEDURE — A9270 NON-COVERED ITEM OR SERVICE: HCPCS | Performed by: INTERNAL MEDICINE

## 2023-04-09 PROCEDURE — 80053 COMPREHEN METABOLIC PANEL: CPT | Mod: 91

## 2023-04-09 PROCEDURE — 770022 HCHG ROOM/CARE - ICU (200)

## 2023-04-09 PROCEDURE — 99285 EMERGENCY DEPT VISIT HI MDM: CPT

## 2023-04-09 PROCEDURE — 700105 HCHG RX REV CODE 258: Performed by: INTERNAL MEDICINE

## 2023-04-09 PROCEDURE — 700102 HCHG RX REV CODE 250 W/ 637 OVERRIDE(OP): Performed by: STUDENT IN AN ORGANIZED HEALTH CARE EDUCATION/TRAINING PROGRAM

## 2023-04-09 PROCEDURE — 82010 KETONE BODYS QUAN: CPT

## 2023-04-09 PROCEDURE — 700101 HCHG RX REV CODE 250: Performed by: INTERNAL MEDICINE

## 2023-04-09 PROCEDURE — 96365 THER/PROPH/DIAG IV INF INIT: CPT

## 2023-04-09 PROCEDURE — 36415 COLL VENOUS BLD VENIPUNCTURE: CPT

## 2023-04-09 PROCEDURE — 82550 ASSAY OF CK (CPK): CPT

## 2023-04-09 PROCEDURE — 83735 ASSAY OF MAGNESIUM: CPT

## 2023-04-09 PROCEDURE — 80048 BASIC METABOLIC PNL TOTAL CA: CPT

## 2023-04-09 PROCEDURE — 85025 COMPLETE CBC W/AUTO DIFF WBC: CPT

## 2023-04-09 PROCEDURE — 84100 ASSAY OF PHOSPHORUS: CPT

## 2023-04-09 PROCEDURE — 700105 HCHG RX REV CODE 258: Performed by: STUDENT IN AN ORGANIZED HEALTH CARE EDUCATION/TRAINING PROGRAM

## 2023-04-09 PROCEDURE — 84295 ASSAY OF SERUM SODIUM: CPT

## 2023-04-09 RX ORDER — DIPHENHYDRAMINE HCL 25 MG
25 TABLET ORAL EVERY 12 HOURS PRN
Status: DISCONTINUED | OUTPATIENT
Start: 2023-04-09 | End: 2023-04-13 | Stop reason: HOSPADM

## 2023-04-09 RX ORDER — DEXTROSE MONOHYDRATE 50 MG/ML
1000 INJECTION, SOLUTION INTRAVENOUS ONCE
Status: COMPLETED | OUTPATIENT
Start: 2023-04-09 | End: 2023-04-10

## 2023-04-09 RX ORDER — DIPHENHYDRAMINE HCL 25 MG
25 TABLET ORAL EVERY 8 HOURS PRN
Status: DISCONTINUED | OUTPATIENT
Start: 2023-04-09 | End: 2023-04-09

## 2023-04-09 RX ORDER — POTASSIUM CHLORIDE 20 MEQ/1
40 TABLET, EXTENDED RELEASE ORAL ONCE
Status: COMPLETED | OUTPATIENT
Start: 2023-04-09 | End: 2023-04-09

## 2023-04-09 RX ORDER — DIPHENHYDRAMINE HCL 25 MG
25 TABLET ORAL EVERY 12 HOURS PRN
Status: DISCONTINUED | OUTPATIENT
Start: 2023-04-09 | End: 2023-04-09

## 2023-04-09 RX ORDER — DEXTROSE AND SODIUM CHLORIDE 5; .9 G/100ML; G/100ML
INJECTION, SOLUTION INTRAVENOUS CONTINUOUS
Status: DISCONTINUED | OUTPATIENT
Start: 2023-04-09 | End: 2023-04-09

## 2023-04-09 RX ORDER — DEXTROSE MONOHYDRATE, SODIUM CHLORIDE, AND POTASSIUM CHLORIDE 50; 1.49; 9 G/1000ML; G/1000ML; G/1000ML
INJECTION, SOLUTION INTRAVENOUS CONTINUOUS
Status: DISCONTINUED | OUTPATIENT
Start: 2023-04-09 | End: 2023-04-09

## 2023-04-09 RX ADMIN — Medication 1 APPLICATOR: at 16:56

## 2023-04-09 RX ADMIN — DEXTROSE MONOHYDRATE 1000 ML: 50 INJECTION, SOLUTION INTRAVENOUS at 22:30

## 2023-04-09 RX ADMIN — DEXTROSE AND SODIUM CHLORIDE: 5; 900 INJECTION, SOLUTION INTRAVENOUS at 07:38

## 2023-04-09 RX ADMIN — POTASSIUM CHLORIDE, DEXTROSE MONOHYDRATE AND SODIUM CHLORIDE: 150; 5; 900 INJECTION, SOLUTION INTRAVENOUS at 05:14

## 2023-04-09 RX ADMIN — DIPHENHYDRAMINE HYDROCHLORIDE 25 MG: 25 TABLET ORAL at 16:56

## 2023-04-09 RX ADMIN — THIAMINE HYDROCHLORIDE 250 MG: 100 INJECTION, SOLUTION INTRAMUSCULAR; INTRAVENOUS at 23:40

## 2023-04-09 RX ADMIN — RIVAROXABAN 10 MG: 10 TABLET, FILM COATED ORAL at 16:56

## 2023-04-09 RX ADMIN — POTASSIUM PHOSPHATE, MONOBASIC AND POTASSIUM PHOSPHATE, DIBASIC 30 MMOL: 224; 236 INJECTION, SOLUTION, CONCENTRATE INTRAVENOUS at 06:05

## 2023-04-09 RX ADMIN — THERA TABS 1 TABLET: TAB at 05:10

## 2023-04-09 RX ADMIN — Medication 1 APPLICATOR: at 05:10

## 2023-04-09 RX ADMIN — POTASSIUM CHLORIDE 40 MEQ: 1500 TABLET, EXTENDED RELEASE ORAL at 01:04

## 2023-04-09 ASSESSMENT — PATIENT HEALTH QUESTIONNAIRE - PHQ9
2. FEELING DOWN, DEPRESSED, IRRITABLE, OR HOPELESS: SEVERAL DAYS
6. FEELING BAD ABOUT YOURSELF - OR THAT YOU ARE A FAILURE OR HAVE LET YOURSELF OR YOUR FAMILY DOWN: NOT AL ALL
1. LITTLE INTEREST OR PLEASURE IN DOING THINGS: MORE THAN HALF THE DAYS
9. THOUGHTS THAT YOU WOULD BE BETTER OFF DEAD, OR OF HURTING YOURSELF: NOT AT ALL
4. FEELING TIRED OR HAVING LITTLE ENERGY: NOT AT ALL
8. MOVING OR SPEAKING SO SLOWLY THAT OTHER PEOPLE COULD HAVE NOTICED. OR THE OPPOSITE, BEING SO FIGETY OR RESTLESS THAT YOU HAVE BEEN MOVING AROUND A LOT MORE THAN USUAL: NOT AT ALL
SUM OF ALL RESPONSES TO PHQ QUESTIONS 1-9: 4
SUM OF ALL RESPONSES TO PHQ9 QUESTIONS 1 AND 2: 3
7. TROUBLE CONCENTRATING ON THINGS, SUCH AS READING THE NEWSPAPER OR WATCHING TELEVISION: NOT AT ALL
5. POOR APPETITE OR OVEREATING: NOT AT ALL
3. TROUBLE FALLING OR STAYING ASLEEP OR SLEEPING TOO MUCH: SEVERAL DAYS

## 2023-04-09 ASSESSMENT — LIFESTYLE VARIABLES
HOW MANY TIMES IN THE PAST YEAR HAVE YOU HAD 5 OR MORE DRINKS IN A DAY: 15
ON A TYPICAL DAY WHEN YOU DRINK ALCOHOL HOW MANY DRINKS DO YOU HAVE: 3
HAVE YOU EVER FELT YOU SHOULD CUT DOWN ON YOUR DRINKING: YES
EVER FELT BAD OR GUILTY ABOUT YOUR DRINKING: YES
EVER HAD A DRINK FIRST THING IN THE MORNING TO STEADY YOUR NERVES TO GET RID OF A HANGOVER: NO
CONSUMPTION TOTAL: POSITIVE
AVERAGE NUMBER OF DAYS PER WEEK YOU HAVE A DRINK CONTAINING ALCOHOL: 4
TOTAL SCORE: 2
DOES PATIENT WANT TO STOP DRINKING: NO
ALCOHOL_USE: YES
TOTAL SCORE: 2
HAVE PEOPLE ANNOYED YOU BY CRITICIZING YOUR DRINKING: NO
TOTAL SCORE: 2

## 2023-04-09 ASSESSMENT — ENCOUNTER SYMPTOMS
FEVER: 0
COUGH: 0
NAUSEA: 0
CHILLS: 0
MYALGIAS: 1
EYE REDNESS: 0
VOMITING: 0
FOCAL WEAKNESS: 0

## 2023-04-09 ASSESSMENT — FIBROSIS 4 INDEX
FIB4 SCORE: 5.7
FIB4 SCORE: 5.7
FIB4 SCORE: 5.09

## 2023-04-09 NOTE — ASSESSMENT & PLAN NOTE
CXR (4/8/23): Expansile lytic lesions in the right humeral shaft    - consider CT C/A/P to evaluate for occult malignancy once her Na is improved and she's stable

## 2023-04-09 NOTE — CARE PLAN
The patient is Watcher - Medium risk of patient condition declining or worsening    Shift Goals  Clinical Goals: electrolytes WDL, VSS, stable neuro  Patient Goals: rest, eat  Family Goals: na    Progress made toward(s) clinical / shift goals:    Problem: Knowledge Deficit - Standard  Goal: Patient and family/care givers will demonstrate understanding of plan of care, disease process/condition, diagnostic tests and medications  4/8/2023 1811 by KESHAWN AlexanderNRoz  Outcome: Progressing  4/8/2023 1811 by Rosita Maradiaga R.N.  Outcome: Progressing     Problem: Fall Risk  Goal: Patient will remain free from falls  Outcome: Progressing     Problem: Pain - Standard  Goal: Alleviation of pain or a reduction in pain to the patient’s comfort goal  Outcome: Progressing       Patient is not progressing towards the following goals:

## 2023-04-09 NOTE — CARE PLAN
Problem: Knowledge Deficit - Standard  Goal: Patient and family/care givers will demonstrate understanding of plan of care, disease process/condition, diagnostic tests and medications  Outcome: Progressing     Problem: Pain - Standard  Goal: Alleviation of pain or a reduction in pain to the patient’s comfort goal  Note: Educated patient on 0-10 pain scale. Assessing and documenting patient's pain per hospital policy and prn and medicating patient for pain per MAR.

## 2023-04-09 NOTE — PROGRESS NOTES
Critical Care Progress Note    Date of admission  4/8/2023    Chief Complaint/Hospital Course  62 y.o. female with history of L4-L5 fusion secondary to DDD, bilateral total knee replacements, left femur fracture of unknown etiology status post intramedullary nailing, bilateral foot fractures of unknown etiology status post intramedullary nailing admitted 4/8/2023 with severe hyponatremia secondary to dehydration/rhabdomyolysis from prolonged immobility x2 days after GLF and inability to get back up due to trouble moving and pain from her extensive surgical history.  In the ER, she was noted to be severely hyponatremic to Na of 102, hypochloremic <60.  She also had an elevated CPK of 6875.  No VALERIE.  CT head and C-spine were unremarkable for any acute etiologies.  Chest x-ray showed extensive lytic lesions in the right humeral shaft.  Patient was admitted to the ICU and started on LR which was then switched to D5-NS with KCl.  Patient sodium has gradually been improving.    Interval Problem Update  Chart review from the past 24 hours includes imaging, laboratory studies, vital signs and notes available.  Pertinent data for today's visit includes    Cardiac: wnl  Pulm: RA  Heme: stable  /renal: Cr wnl  GI/endo: NPO  ID:  mild leukocytosis, downtrending   I/O: +1.6L  Additional Labs/Imaging:   - Na up from 101 to 108  - AST/ALT downtrending  - CPK down from 6874 to 4029      Review of Systems  Review of Systems   Constitutional:  Positive for malaise/fatigue. Negative for chills and fever.   Eyes:  Negative for redness.   Respiratory:  Negative for cough.    Cardiovascular:  Negative for chest pain.   Gastrointestinal:  Negative for nausea and vomiting.   Musculoskeletal:  Positive for myalgias.   Neurological:  Negative for focal weakness.      Vital Signs for last 24 hours   Temp:  [36.4 °C (97.6 °F)-37.2 °C (98.9 °F)] 37.2 °C (98.9 °F)  Pulse:  [] 69  Resp:  [14-71] 16  BP: ()/(55-92) 138/74  SpO2:  [90  %-98 %] 90 %    Hemodynamic parameters for last 24 hours       Respiratory Information for the last 24 hours       Physical Exam   Physical Exam  Vitals and nursing note reviewed.   Constitutional:       General: She is not in acute distress.  HENT:      Head:      Comments: Ecchymosis around L side of face     Mouth/Throat:      Mouth: Mucous membranes are dry.   Eyes:      General: No scleral icterus.     Conjunctiva/sclera: Conjunctivae normal.   Cardiovascular:      Rate and Rhythm: Normal rate and regular rhythm.   Pulmonary:      Effort: Pulmonary effort is normal. No respiratory distress.      Breath sounds: No wheezing.   Abdominal:      Palpations: Abdomen is soft.   Musculoskeletal:         General: No deformity or signs of injury.   Skin:     General: Skin is warm and dry.   Neurological:      General: No focal deficit present.      Mental Status: She is alert and oriented to person, place, and time.   Psychiatric:         Mood and Affect: Mood normal.         Behavior: Behavior normal.       Medications  Current Facility-Administered Medications   Medication Dose Route Frequency Provider Last Rate Last Admin    Nozin nasal  swab  1 Applicator Each Nostril BID Sheryl Townsend D.O.   1 Applicator at 04/09/23 0510    potassium phosphate IVPB 30 mmol in 500 mL D5W (premix)  30 mmol Intravenous Once Keo Mcintosh M.D. 83.3 mL/hr at 04/09/23 0605 30 mmol at 04/09/23 0605    D5 NS infusion   Intravenous Continuous Radha Tilley M.D. 125 mL/hr at 04/09/23 0738 New Bag at 04/09/23 0738    senna-docusate (PERICOLACE or SENOKOT S) 8.6-50 MG per tablet 2 Tablet  2 Tablet Oral BID JENISE RichardsO.   2 Tablet at 04/08/23 1726    And    polyethylene glycol/lytes (MIRALAX) PACKET 1 Packet  1 Packet Oral QDAY PRN Charlie Barnard D.O.        And    magnesium hydroxide (MILK OF MAGNESIA) suspension 30 mL  30 mL Oral QDAY PRN Charlie Barnard D.O.        And    bisacodyl (DULCOLAX) suppository 10 mg  10 mg  Rectal QDAY PRN Charlie Barnard D.O.        rivaroxaban (XARELTO) tablet 10 mg  10 mg Oral DAILY AT 1800 Charlie Barnard D.O.   10 mg at 04/08/23 1726    acetaminophen (Tylenol) tablet 650 mg  650 mg Oral Q6HRS PRN Charlie Barnard D.O.        multivitamin tablet 1 Tablet  1 Tablet Oral DAILY Sheryl Townsend D.O.   1 Tablet at 04/09/23 0510       Fluids    Intake/Output Summary (Last 24 hours) at 4/9/2023 1016  Last data filed at 4/9/2023 0800  Gross per 24 hour   Intake 2489.2 ml   Output 800 ml   Net 1689.2 ml       Laboratory      Recent Labs     04/08/23 1330 04/08/23 2100 04/09/23  0500   CPKTOTAL 6875* 5637* 4029*     Recent Labs     04/08/23  1330 04/08/23  1559 04/08/23 2100 04/09/23  0100 04/09/23  0500   SODIUM 102*   < > 104* 105* 108*   POTASSIUM 2.7*   < > 3.4* 3.7 3.9   CHLORIDE <60*   < > 64* 67* 72*   CO2 24   < > 21 22 21   BUN 19   < > 14 13 13   CREATININE 0.69   < > 0.46* 0.31* 0.38*   MAGNESIUM 1.4*  --   --  2.4  --    PHOSPHORUS  --   --   --  2.3*  --    CALCIUM 9.6   < > 9.0 8.5 8.2*    < > = values in this interval not displayed.     Recent Labs     04/08/23 1330 04/08/23  1559 04/08/23 2100 04/09/23  0100 04/09/23  0500   ALTSGPT 64*  --   --  58* 56*   ASTSGOT 203*  --   --  177* 165*   ALKPHOSPHAT 92  --   --  75 74   TBILIRUBIN 1.2  --   --  0.9 0.9   GLUCOSE 109*   < > 95 88 84    < > = values in this interval not displayed.     Recent Labs     04/08/23  1330 04/08/23  1636 04/09/23  0100 04/09/23  0500   WBC  --  14.7* 12.1*  --    NEUTSPOLYS  --  84.00* 77.80*  --    LYMPHOCYTES  --  8.40* 11.60*  --    MONOCYTES  --  7.60 9.00  --    EOSINOPHILS  --  0.00 0.50  --    BASOPHILS  --  0.00 0.20  --    ASTSGOT 203*  --  177* 165*   ALTSGPT 64*  --  58* 56*   ALKPHOSPHAT 92  --  75 74   TBILIRUBIN 1.2  --  0.9 0.9     Recent Labs     04/08/23  1330 04/08/23  1636 04/09/23  0100   RBC  --  3.85* 3.99*   HEMOGLOBIN  --  12.5 13.4   HEMATOCRIT  --  33.5* 35.2*   PLATELETCT  --  283 240    PROTHROMBTM 13.3  --   --    APTT 29.8  --   --    INR 1.02  --   --        Imaging  Reviewed     Assessment/Plan  * Hyponatremia- (present on admission)  Assessment & Plan  Likely hypovolemic hyponatremia since she was down for multiple days and also has a significant rhabdomyolysis     - D5-NS at 125cc/hr  - goal repletion 8-12 q24hrs - currently progressing appropriately  - start PO diet - ok to drink water  - Na q6h   - neuro checks    Lytic bone lesions on xray  Assessment & Plan  CXR (4/8/23): Expansile lytic lesions in the right humeral shaft    - consider CT C/A/P to evaluate for occult malignancy once her Na is improved and she's stable    Ground-level fall  Assessment & Plan  CT head and c-spine w/o any acute abnormalities.    - PT/OT    Hypomagnesemia  Assessment & Plan  - replete as appropriate    Hypokalemia  Assessment & Plan  - replete as appropriate    Rhabdomyolysis  Assessment & Plan  Due to prolonged immobility after falling and dehydration. Improving with IVFs.  Has been mobile here with assistance      - IVFs as noted for hyponatremia  - PO diet and ok to drink water  - trend CK         VTE:  Lovenox  Ulcer: Not Indicated  Lines: None    I have performed a physical exam and reviewed and updated ROS and Plan today (4/9/2023). In review of yesterday's note (4/8/2023), there are no changes except as documented above.     Discussed patient condition and risk of morbidity and/or mortality with RN, RT, Pharmacy, Charge nurse / hot rounds, and Patient    This note was generated using voice recognition software which has a chance of producing errors of grammar and content.  I have made every reasonable attempt to find and correct any errors, but it should be expected that some may not be found prior to finalization of this note.    The patient remains critically ill.  Critical care time = 40 minutes in directly providing and coordinating critical care and extensive data review.  No time overlap and  excludes procedures.    __________  Radha Tilley MD  Pulmonary and Critical Care Medicine  [facility]

## 2023-04-09 NOTE — PROGRESS NOTES
Pt arrived at 1700 from RD05 to T924 via stretcher by STACEY RN and CA.  Monitored, RA.    BP: 143/72  HR: 81  SpO2: 97%, RA  Weight: 77.5kg  Temp: 98.0F    4 Eyes Skin Assessment Completed by REID Becker and REID Marin.    Head Bruising and Swelling Left eye and left cheek  Ears WDL  Nose WDL  Mouth WDL  Neck WDL  Breast/Chest Redness, Blanching, and Excoriation bilat breast  Shoulder Blades Redness L shoulder  Spine Redness and Blanching  (R) Arm/Elbow/Hand Redness skin tear right elbow  (L) Arm/Elbow/Hand Redness, Bruising, and Swelling left arm bruising  Abdomen Bruising LUQ  Groin WDL  Scrotum/Coccyx/Buttocks Redness and Blanching  (R) Leg Redness, Blanching, Scab, Bruising, and Abrasion scattered bilat thighs and legs  (L) Leg Redness, Blanching, Scab, Bruising, and Abrasion scattered bilat thighs and legs  (R) Heel/Foot/Toe WDL  (L) Heel/Foot/Toe WDL          Devices In Places ECG, Blood Pressure Cuff, Pulse Ox, and SCD's      Interventions In Place Sacral Mepilex, Pillows, Q2 Turns, Low Air Loss Mattress, and Heels Loaded W/Pillows    Possible Skin Injury No    Pictures Uploaded Into Epic N/A  Wound Consult Placed N/A  RN Wound Prevention Protocol Ordered No

## 2023-04-09 NOTE — CARE PLAN
The patient is Stable - Low risk of patient condition declining or worsening    Shift Goals  Clinical Goals: monitor sodium, vss  Patient Goals: rest  Family Goals: na    Progress made toward(s) clinical / shift goals:    Problem: Knowledge Deficit - Standard  Goal: Patient and family/care givers will demonstrate understanding of plan of care, disease process/condition, diagnostic tests and medications  Outcome: Progressing     Problem: Fall Risk  Goal: Patient will remain free from falls  Outcome: Progressing     Problem: Pain - Standard  Goal: Alleviation of pain or a reduction in pain to the patient’s comfort goal  Outcome: Progressing       Patient is not progressing towards the following goals:

## 2023-04-09 NOTE — PROGRESS NOTES
tech from Lab called with critical result of sodium at 2123. Critical lab result read back to tech.   Dr. Townsend notified of critical lab result at 2123.  Critical lab result read back by Dr. Townsend.

## 2023-04-09 NOTE — ASSESSMENT & PLAN NOTE
Due to prolonged immobility after falling and dehydration. Improving with IVFs.  Has been mobile here with assistance      - IVFs as noted for hyponatremia  - PO diet and ok to drink water  - trend CK

## 2023-04-09 NOTE — PROGRESS NOTES
tech from Lab called with critical result of CPK at 2221. Critical lab result read back to tech.   Eugenia JOHNSTON notified of critical lab result at 2221.  Critical lab result read back by Eugenia Olivera.

## 2023-04-10 ENCOUNTER — APPOINTMENT (OUTPATIENT)
Dept: RADIOLOGY | Facility: MEDICAL CENTER | Age: 63
DRG: 640 | End: 2023-04-10
Attending: HOSPITALIST
Payer: COMMERCIAL

## 2023-04-10 PROBLEM — G93.41 ENCEPHALOPATHY, METABOLIC: Status: ACTIVE | Noted: 2023-04-10

## 2023-04-10 LAB
ANION GAP SERPL CALC-SCNC: 11 MMOL/L (ref 7–16)
BASOPHILS # BLD AUTO: 1 % (ref 0–1.8)
BASOPHILS # BLD: 0.09 K/UL (ref 0–0.12)
BUN SERPL-MCNC: 7 MG/DL (ref 8–22)
CALCIUM SERPL-MCNC: 8.3 MG/DL (ref 8.5–10.5)
CHLORIDE SERPL-SCNC: 85 MMOL/L (ref 96–112)
CK SERPL-CCNC: 1599 U/L (ref 0–154)
CO2 SERPL-SCNC: 23 MMOL/L (ref 20–33)
CREAT SERPL-MCNC: 0.4 MG/DL (ref 0.5–1.4)
EOSINOPHIL # BLD AUTO: 0.15 K/UL (ref 0–0.51)
EOSINOPHIL NFR BLD: 1.6 % (ref 0–6.9)
ERYTHROCYTE [DISTWIDTH] IN BLOOD BY AUTOMATED COUNT: 44.1 FL (ref 35.9–50)
GFR SERPLBLD CREATININE-BSD FMLA CKD-EPI: 111 ML/MIN/1.73 M 2
GLUCOSE SERPL-MCNC: 123 MG/DL (ref 65–99)
HCT VFR BLD AUTO: 35.5 % (ref 37–47)
HGB BLD-MCNC: 12.6 G/DL (ref 12–16)
IMM GRANULOCYTES # BLD AUTO: 0.22 K/UL (ref 0–0.11)
IMM GRANULOCYTES NFR BLD AUTO: 2.4 % (ref 0–0.9)
LYMPHOCYTES # BLD AUTO: 1.8 K/UL (ref 1–4.8)
LYMPHOCYTES NFR BLD: 19.4 % (ref 22–41)
MAGNESIUM SERPL-MCNC: 2 MG/DL (ref 1.5–2.5)
MCH RBC QN AUTO: 33.3 PG (ref 27–33)
MCHC RBC AUTO-ENTMCNC: 35.5 G/DL (ref 33.6–35)
MCV RBC AUTO: 93.9 FL (ref 81.4–97.8)
MONOCYTES # BLD AUTO: 0.96 K/UL (ref 0–0.85)
MONOCYTES NFR BLD AUTO: 10.3 % (ref 0–13.4)
NEUTROPHILS # BLD AUTO: 6.06 K/UL (ref 2–7.15)
NEUTROPHILS NFR BLD: 65.3 % (ref 44–72)
NRBC # BLD AUTO: 0 K/UL
NRBC BLD-RTO: 0 /100 WBC
PHOSPHATE SERPL-MCNC: 2.5 MG/DL (ref 2.5–4.5)
PLATELET # BLD AUTO: 253 K/UL (ref 164–446)
PMV BLD AUTO: 10.3 FL (ref 9–12.9)
POTASSIUM SERPL-SCNC: 3.4 MMOL/L (ref 3.6–5.5)
RBC # BLD AUTO: 3.78 M/UL (ref 4.2–5.4)
SODIUM SERPL-SCNC: 119 MMOL/L (ref 135–145)
SODIUM SERPL-SCNC: 119 MMOL/L (ref 135–145)
SODIUM SERPL-SCNC: 122 MMOL/L (ref 135–145)
SODIUM SERPL-SCNC: 124 MMOL/L (ref 135–145)
WBC # BLD AUTO: 9.3 K/UL (ref 4.8–10.8)

## 2023-04-10 PROCEDURE — 700105 HCHG RX REV CODE 258: Performed by: INTERNAL MEDICINE

## 2023-04-10 PROCEDURE — 99223 1ST HOSP IP/OBS HIGH 75: CPT | Mod: 25 | Performed by: HOSPITALIST

## 2023-04-10 PROCEDURE — 82550 ASSAY OF CK (CPK): CPT

## 2023-04-10 PROCEDURE — 80048 BASIC METABOLIC PNL TOTAL CA: CPT

## 2023-04-10 PROCEDURE — 97162 PT EVAL MOD COMPLEX 30 MIN: CPT

## 2023-04-10 PROCEDURE — 700102 HCHG RX REV CODE 250 W/ 637 OVERRIDE(OP): Performed by: STUDENT IN AN ORGANIZED HEALTH CARE EDUCATION/TRAINING PROGRAM

## 2023-04-10 PROCEDURE — 85025 COMPLETE CBC W/AUTO DIFF WBC: CPT

## 2023-04-10 PROCEDURE — 73030 X-RAY EXAM OF SHOULDER: CPT | Mod: RT

## 2023-04-10 PROCEDURE — 700102 HCHG RX REV CODE 250 W/ 637 OVERRIDE(OP): Performed by: INTERNAL MEDICINE

## 2023-04-10 PROCEDURE — 83735 ASSAY OF MAGNESIUM: CPT

## 2023-04-10 PROCEDURE — 700105 HCHG RX REV CODE 258: Performed by: STUDENT IN AN ORGANIZED HEALTH CARE EDUCATION/TRAINING PROGRAM

## 2023-04-10 PROCEDURE — 99291 CRITICAL CARE FIRST HOUR: CPT | Performed by: STUDENT IN AN ORGANIZED HEALTH CARE EDUCATION/TRAINING PROGRAM

## 2023-04-10 PROCEDURE — 84295 ASSAY OF SERUM SODIUM: CPT

## 2023-04-10 PROCEDURE — 770000 HCHG ROOM/CARE - INTERMEDIATE ICU *

## 2023-04-10 PROCEDURE — 700111 HCHG RX REV CODE 636 W/ 250 OVERRIDE (IP): Performed by: INTERNAL MEDICINE

## 2023-04-10 PROCEDURE — A9270 NON-COVERED ITEM OR SERVICE: HCPCS | Performed by: STUDENT IN AN ORGANIZED HEALTH CARE EDUCATION/TRAINING PROGRAM

## 2023-04-10 PROCEDURE — 99232 SBSQ HOSP IP/OBS MODERATE 35: CPT | Performed by: INTERNAL MEDICINE

## 2023-04-10 PROCEDURE — 84100 ASSAY OF PHOSPHORUS: CPT

## 2023-04-10 PROCEDURE — 700105 HCHG RX REV CODE 258: Performed by: HOSPITALIST

## 2023-04-10 PROCEDURE — A9270 NON-COVERED ITEM OR SERVICE: HCPCS | Performed by: INTERNAL MEDICINE

## 2023-04-10 PROCEDURE — 97535 SELF CARE MNGMENT TRAINING: CPT

## 2023-04-10 RX ORDER — DEXTROSE MONOHYDRATE 50 MG/ML
500 INJECTION, SOLUTION INTRAVENOUS ONCE
Status: COMPLETED | OUTPATIENT
Start: 2023-04-10 | End: 2023-04-10

## 2023-04-10 RX ORDER — DEXTROSE MONOHYDRATE 50 MG/ML
INJECTION, SOLUTION INTRAVENOUS CONTINUOUS
Status: DISCONTINUED | OUTPATIENT
Start: 2023-04-10 | End: 2023-04-11

## 2023-04-10 RX ADMIN — RIVAROXABAN 10 MG: 10 TABLET, FILM COATED ORAL at 18:46

## 2023-04-10 RX ADMIN — SENNOSIDES AND DOCUSATE SODIUM 2 TABLET: 50; 8.6 TABLET ORAL at 18:00

## 2023-04-10 RX ADMIN — DEXTROSE MONOHYDRATE 500 ML: 50 INJECTION, SOLUTION INTRAVENOUS at 22:34

## 2023-04-10 RX ADMIN — THIAMINE HYDROCHLORIDE 250 MG: 100 INJECTION, SOLUTION INTRAMUSCULAR; INTRAVENOUS at 05:52

## 2023-04-10 RX ADMIN — Medication 1 APPLICATOR: at 06:24

## 2023-04-10 RX ADMIN — ACETAMINOPHEN 650 MG: 325 TABLET, FILM COATED ORAL at 22:06

## 2023-04-10 RX ADMIN — THIAMINE HYDROCHLORIDE 250 MG: 100 INJECTION, SOLUTION INTRAMUSCULAR; INTRAVENOUS at 14:00

## 2023-04-10 RX ADMIN — THERA TABS 1 TABLET: TAB at 06:24

## 2023-04-10 RX ADMIN — DEXTROSE MONOHYDRATE: 50 INJECTION, SOLUTION INTRAVENOUS at 18:47

## 2023-04-10 RX ADMIN — DIPHENHYDRAMINE HYDROCHLORIDE 25 MG: 25 TABLET ORAL at 13:56

## 2023-04-10 RX ADMIN — DIPHENHYDRAMINE HYDROCHLORIDE 25 MG: 25 TABLET ORAL at 23:27

## 2023-04-10 ASSESSMENT — COGNITIVE AND FUNCTIONAL STATUS - GENERAL
CLIMB 3 TO 5 STEPS WITH RAILING: A LITTLE
STANDING UP FROM CHAIR USING ARMS: A LITTLE
MOVING TO AND FROM BED TO CHAIR: A LITTLE
CLIMB 3 TO 5 STEPS WITH RAILING: A LITTLE
WALKING IN HOSPITAL ROOM: A LITTLE
HELP NEEDED FOR BATHING: A LITTLE
STANDING UP FROM CHAIR USING ARMS: A LITTLE
MOVING FROM LYING ON BACK TO SITTING ON SIDE OF FLAT BED: A LITTLE
DRESSING REGULAR LOWER BODY CLOTHING: A LITTLE
MOBILITY SCORE: 21
MOBILITY SCORE: 18
WALKING IN HOSPITAL ROOM: A LITTLE
SUGGESTED CMS G CODE MODIFIER MOBILITY: CK
SUGGESTED CMS G CODE MODIFIER MOBILITY: CJ
SUGGESTED CMS G CODE MODIFIER DAILY ACTIVITY: CJ
TURNING FROM BACK TO SIDE WHILE IN FLAT BAD: A LITTLE
DAILY ACTIVITIY SCORE: 22

## 2023-04-10 ASSESSMENT — ENCOUNTER SYMPTOMS
SHORTNESS OF BREATH: 0
MEMORY LOSS: 1
FEVER: 0
CHILLS: 0

## 2023-04-10 ASSESSMENT — GAIT ASSESSMENTS
DEVIATION: BRADYKINETIC;DECREASED HEEL STRIKE;DECREASED TOE OFF
GAIT LEVEL OF ASSIST: CONTACT GUARD ASSIST
DISTANCE (FEET): 60
ASSISTIVE DEVICE: FRONT WHEEL WALKER

## 2023-04-10 ASSESSMENT — PAIN DESCRIPTION - PAIN TYPE
TYPE: ACUTE PAIN

## 2023-04-10 NOTE — CARE PLAN
The patient is Stable - Low risk of patient condition declining or worsening    Shift Goals  Clinical Goals: monitor sodium, vss  Patient Goals: rest  Family Goals: na    Problem: Fall Risk  Goal: Patient will remain free from falls  Outcome: Progressing

## 2023-04-10 NOTE — PROGRESS NOTES
Dr. Mcintosh aware of critical Na 119, no maintenance fluids at this time. Will trend Na in 4 hours.

## 2023-04-10 NOTE — ASSESSMENT & PLAN NOTE
Severe hyponatremia at 101 that was  symptomatic.  She was admitted to the intensive care unit and treated with multiple variations of fluids   The patient was slowly brought back to higher sodium levels, currently approaching normal range  The patient's symptoms have resolved, improved  Lexapro has been restarted  The patient will have to work on her overhydration, polydipsia

## 2023-04-10 NOTE — PROGRESS NOTES
Came in noticed patient had rapid rise of sodium so stopped D5NS and gave 1L D5W over 1 hour.  Will send BMP now as it has been 4 hours likely she is even higher. Start IV thiamine to prevent osmotic demyelination syndrome. Will need to follow sodium closely. She was already 16 change in 29hrs.     Patient remains in critical condition from acute overcorrection of sodium and bolus of D5W . Critical care time provided was 37 minutes in addition to Dr Tilley on same day. This excludes all separate billable procedures.     Keo Mcintosh MD  Critical Care Medicine

## 2023-04-10 NOTE — CONSULTS
"Hospital Medicine Consultation    Date of Service  4/10/2023    Referring Physician  Rico Aguilar M.D.    Consulting Physician  Rubio Smith M.D.    Reason for Consultation  hyponatremia    History of Presenting Illness  62 y.o. female who presented 4/8/2023 with altered mental status.  Ms. Fragoso has no present no medical conditions no takes Lexapro for mild depression that sustained a fall in bed was too weak to get up.  Her friend came by and found her on the floor.  Upon questioning her friend who is at bedside states that patient had been texting her and they were illegible texts.  She called paramedics and in the emergency room she was found to have a sodium of 102 and repeat was 101.  She was admitted to the intensive care unit on half-normal saline.  She was also found to be in rhabdomyolysis and her CPKs were trended.    4/10/2023: Patient seen and evaluated in the IMCU.  Her sodium is now 123 and she is on fluid restriction.  Upon questioning she states she drinks water \"all day\" and was never told that there could be side effects to this.  Been on discussion about limiting to somewhere between 2 and 3 L of fluids per day at home.    Review of Systems  Review of Systems   Constitutional:  Negative for chills and fever.   Respiratory:  Negative for shortness of breath.    Cardiovascular:  Negative for chest pain.   Musculoskeletal:         No shoulder pain   Psychiatric/Behavioral:  Positive for memory loss.    All other systems reviewed and are negative.    Past Medical History  She denies any chronic medical conditions to her knowledge.    Surgical History  16 surgeries none recently    Family History  No family history of hyponatremia    Social History   reports that she has never smoked. She has been exposed to tobacco smoke. She has never used smokeless tobacco. She reports current alcohol use of about 8.4 oz per week. She reports current drug use. Drug: Inhaled.    Medications  Prior to " Admission Medications   Prescriptions Last Dose Informant Patient Reported? Taking?   albuterol 108 (90 Base) MCG/ACT Aero Soln inhalation aerosol 4/7/2023 at 2200 Patient Yes Yes   Sig: Inhale 2 Puffs every four hours as needed for Shortness of Breath.   aspirin (ASA) 325 MG Tab 4/8/2023 at 0830 Patient Yes Yes   Sig: Take 650 mg by mouth every 6 hours as needed.   budesonide-formoterol (SYMBICORT) 160-4.5 MCG/ACT Aerosol 4/8/2023 at 0900 Patient Yes Yes   Sig: Inhale 2 Puffs 2 times a day.   escitalopram (LEXAPRO) 20 MG tablet 4/8/2023 at 0900 Patient Yes Yes   Sig: Take 20 mg by mouth every day.      Facility-Administered Medications: None       Allergies  No Known Allergies    Physical Exam  Temp:  [36.3 °C (97.4 °F)-37.2 °C (98.9 °F)] 37.2 °C (98.9 °F)  Pulse:  [66-95] 95  Resp:  [12-34] 26  BP: ()/(51-65) 123/57  SpO2:  [90 %-95 %] 90 %    Physical Exam  Vitals and nursing note reviewed.   Constitutional:       General: She is not in acute distress.     Appearance: She is not toxic-appearing.   Cardiovascular:      Rate and Rhythm: Normal rate and regular rhythm.   Abdominal:      General: There is no distension.      Tenderness: There is no abdominal tenderness.   Musculoskeletal:      Cervical back: Normal range of motion and neck supple.      Right lower leg: No edema.      Left lower leg: No edema.      Comments: Normal ROM right shoulder   Neurological:      General: No focal deficit present.      Mental Status: She is alert and oriented to person, place, and time.   Psychiatric:         Mood and Affect: Mood normal.         Behavior: Behavior normal.         Thought Content: Thought content normal.         Judgment: Judgment normal.       Fluids  Date 04/10/23 0700 - 04/11/23 0659   Shift 8493-9761 5593-8447 3226-2381 24 Hour Total   INTAKE   I.V. 0   0   IV Piggyback 86.3   86.3   Shift Total 86.3   86.3   OUTPUT   Urine 400   400   Shift Total 400   400   Weight (kg) 77.8 77.8 77.8 77.8  "      Laboratory  Recent Labs     04/08/23  1636 04/09/23  0100 04/10/23  0620   WBC 14.7* 12.1* 9.3   RBC 3.85* 3.99* 3.78*   HEMOGLOBIN 12.5 13.4 12.6   HEMATOCRIT 33.5* 35.2* 35.5*   MCV 87.0 88.2 93.9   MCH 32.5 33.6* 33.3*   MCHC 37.3* 37.4* 35.5*   RDW 40.5 40.5 44.1   PLATELETCT 283 240 253   MPV 9.7 9.9 10.3     Recent Labs     04/09/23  0500 04/09/23  0919 04/09/23  2215 04/10/23  0200 04/10/23  0620 04/10/23  1022   SODIUM 108*   < > 119* 119* 119* 122*   POTASSIUM 3.9  --  3.7  --  3.4*  --    CHLORIDE 72*  --  84*  --  85*  --    CO2 21  --  21  --  23  --    GLUCOSE 84  --  121*  --  123*  --    BUN 13  --  9  --  7*  --    CREATININE 0.38*  --  0.45*  --  0.40*  --    CALCIUM 8.2*  --  8.6  --  8.3*  --     < > = values in this interval not displayed.     Recent Labs     04/08/23  1330   APTT 29.8   INR 1.02                 Imaging  CT-CSPINE WITHOUT PLUS RECONS   Final Result      Degenerative change without evidence of cervical spine fracture.      CT-HEAD W/O   Final Result      Head CT without contrast within normal limits. No evidence of acute cerebral infarction, hemorrhage or mass lesion.         DX-CHEST-PORTABLE (1 VIEW)   Final Result      1.  No acute cardiopulmonary disease.   2.  Expansile lytic lesion in the RIGHT humeral shaft, of uncertain etiology.      DX-SHOULDER 2+ RIGHT    (Results Pending)       Assessment/Plan  * Hyponatremia- (present on admission)  Assessment & Plan  Severe hyponatremia at 101 that was very symptomatic.  She was admitted to the intensive care unit and treated with multiple variations of fluids now up to 123.  IV fluids will be stopped and she will be placed on a fluid restricted diet.  Upon review of her outpatient medications she is not on diuretics.  She is on Lexapro which is relatively contraindicated though I suspect this is not the source of her hyponatremia as she admits to very severe polydipsia and drinks water \"all day\".  Serial sodiums every 6 hours " has been ordered.  Once her sodium improves I think it is reasonable to restart the Lexapro so she does not withdraw from this.  Long discussion was had about restricting to normal amount of water such as 2 to 3 L a day at home which will be difficult for her but she understands the need for this.    Encephalopathy, metabolic- (present on admission)  Assessment & Plan  Secondary to severe hyponatremia  This has resolved    Lytic bone lesions on xray- (present on admission)  Assessment & Plan  Xray of the shoulder ordered.    Hypomagnesemia- (present on admission)  Assessment & Plan  Replacement given    Hypokalemia- (present on admission)  Assessment & Plan  Replacement given    Rhabdomyolysis- (present on admission)  Assessment & Plan  CPK was 6,800 and has come down to 1,500 with IV fluids which are being held

## 2023-04-10 NOTE — THERAPY
"Physical Therapy   Initial Evaluation     Patient Name: Farzana Fragoso  Age:  62 y.o., Sex:  female  Medical Record #: [id number]  Today's Date: 4/10/2023     Precautions  Precautions: Fall Risk    Assessment  Patient is a 62 y.o. female who sustained a fall at home and was on the floor for 2 days. Pt admitted with hyponatremia, rhabdomyolysis. PMH significant for lumbar surgery, B TKA, L femur fx, B foot fx's.    Pt received in bed and agreeable to PT evaluation. Pt presents with generalized weakness, impaired tolerance to activity, and impaired balance with need for use of FWW. Pt required standby to [facility] to mobilize with a FWW for support. Pt educated on use of FWW and discussed having someone to stay with her upon discharge. Anticipate pt will be able to return home with a FWW and HHPT follow up for safety. Will follow for acute PT to progress as able.    Plan    Physical Therapy Initial Treatment Plan   Treatment Plan : Gait Training, Neuro Re-Education / Balance, Self Care / Home Evaluation, Therapeutic Activities, Therapeutic Exercise  Treatment Frequency: 3 Times per Week  Duration: Until Therapy Goals Met    DC Equipment Recommendations: Front-Wheel Walker  Discharge Recommendations: Recommend home health for continued physical therapy services       Subjective    \"I would rather go home with a walker, better safe than sorry\"     Objective       04/10/23 0931   Precautions   Precautions Fall Risk   Vitals   Vitals Comments BP low, but stable at 101/68 upon sitting. Pt mildly lightheaded.   Pain 0 - 10 Group   Therapist Pain Assessment Post Activity Pain Same as Prior to Activity;Nurse Notified   Prior Living Situation   Prior Services Home-Independent   Housing / Facility 1 Story Apartment / Condo   Steps Into Home 0   Equipment Owned Single Point Cane   Lives with - Patient's Self Care Capacity Alone and Able to Care For Self   Comments Pt reports she has a friend who can check on her, but unsure if she can " stay to help.   Prior Level of Functional Mobility   Bed Mobility Independent   Transfer Status Independent   Ambulation Independent   Assistive Devices Used None   Stairs Independent   History of Falls   History of Falls Yes   Date of Last Fall   (reason for admit)   Cognition    Level of Consciousness Alert   Comments Pleasant and cooperative, receptive to education   Active ROM Lower Body    Active ROM Lower Body  WDL   Strength Lower Body   Comments BLE grossly 4/5, no focal deficits   Sensation Lower Body   Comments Denies LE sensory changes   Lower Body Muscle Tone   Lower Body Muscle Tone  WDL   Coordination Lower Body    Coordination Lower Body  WDL   Vision   Vision Comments Denies visual changes   Balance Assessment   Sitting Balance (Static) Fair +   Sitting Balance (Dynamic) Fair   Standing Balance (Static) Fair -   Standing Balance (Dynamic) Fair -   Weight Shift Sitting Fair   Weight Shift Standing Fair   Comments with FWW   Bed Mobility    Supine to Sit Standby Assist   Sit to Supine Standby Assist   Scooting Standby Assist   Comments extra time, HOB flat   Gait Analysis   Gait Level Of Assist Contact Guard Assist   Assistive Device Front Wheel Walker   Distance (Feet) 60   # of Times Distance was Traveled 1   Deviation Bradykinetic;Decreased Heel Strike;Decreased Toe Off   Comments Cues for FWW use, posture, awareness of fatigue   Functional Mobility   Sit to Stand Standby Assist   Bed, Chair, Wheelchair Transfer Standby Assist   Mobility up with FWW   How much difficulty does the patient currently have...   Turning over in bed (including adjusting bedclothes, sheets and blankets)? 3   Sitting down on and standing up from a chair with arms (e.g., wheelchair, bedside commode, etc.) 3   Moving from lying on back to sitting on the side of the bed? 3   How much help from another person does the patient currently need...   Moving to and from a bed to a chair (including a wheelchair)? 3   Need to walk in a  hospital room? 3   Climbing 3-5 steps with a railing? 3   6 clicks Mobility Score 18   Short Term Goals    Short Term Goal # 1 Pt will transfer with FWW and supervision to progress function in 6 visits.   Short Term Goal # 2 Pt will ambulate 150ft with FWW and supervision to progress function in 6 visits.   Education Group   Education Provided Role of Physical Therapist   Role of Physical Therapist Patient Response Patient;Acceptance;Explanation;Verbal Demonstration   Physical Therapy Initial Treatment Plan    Treatment Plan  Gait Training;Neuro Re-Education / Balance;Self Care / Home Evaluation;Therapeutic Activities;Therapeutic Exercise   Treatment Frequency 3 Times per Week   Duration Until Therapy Goals Met   Problem List    Problems Impaired Transfers;Impaired Ambulation;Decreased Activity Tolerance   Anticipated Discharge Equipment and Recommendations   DC Equipment Recommendations Front-Wheel Walker   Discharge Recommendations Recommend home health for continued physical therapy services   Interdisciplinary Plan of Care Collaboration   IDT Collaboration with  Nursing   Patient Position at End of Therapy In Bed;Call Light within Reach;Tray Table within Reach;Phone within Reach   Collaboration Comments RN updated

## 2023-04-10 NOTE — PROGRESS NOTES
"Critical Care Progress Note    Date of admission  4/8/2023    Chief Complaint/Hospital Course  From Dr. Tilley's note: \"62 y.o. female with history of L4-L5 fusion secondary to DDD, bilateral total knee replacements, left femur fracture of unknown etiology status post intramedullary nailing, bilateral foot fractures of unknown etiology status post intramedullary nailing admitted 4/8/2023 with severe hyponatremia secondary to dehydration/rhabdomyolysis from prolonged immobility x2 days after GLF and inability to get back up due to trouble moving and pain from her extensive surgical history.  In the ER, she was noted to be severely hyponatremic to Na of 102, hypochloremic <60.  She also had an elevated CPK of 6875.  No VALERIE.  CT head and C-spine were unremarkable for any acute etiologies.  Chest x-ray showed extensive lytic lesions in the right humeral shaft.  Patient was admitted to the ICU and started on LR which was then switched to D5-NS with KCl.  Patient sodium has gradually been improving.    Interval Problem Update  Chart review from the past 24 hours includes imaging, laboratory studies, vital signs and notes available.  Pertinent data for today's visit includes    24 hour events:  24h events: On room air, occ loose stools, given 1 L D5W overnight d/t overcorrection of Na. She is A&Ox4 and has been walking around the unit.   Tubes, Lines, Drains: PIVs  Drips: no drips   Nutrition: cardiac  Notable lab trends: Sodium up to 119. Stable on recheck.   Tmax: afebrile  Antibiotics: None  Cultures: None  I/O: +3.6 L since admit   PPX: Pantoprazole   BM regimen: MiraLAX, senna-docusate, milk magnesia, bisacodyl  Last BM: today      Review of Systems  No new complaints.     Vital Signs for last 24 hours   Temp:  [36.3 °C (97.4 °F)-37.3 °C (99.2 °F)] 36.4 °C (97.6 °F)  Pulse:  [] 72  Resp:  [12-71] 19  BP: ()/(47-74) 85/52  SpO2:  [90 %-98 %] 92 %      Physical Exam   Physical Exam  Vitals and nursing " note reviewed.   Constitutional:       General: She is not in acute distress.     Appearance: She is not ill-appearing, toxic-appearing or diaphoretic.   HENT:      Head:      Comments: Ecchymosis around L side of face     Mouth/Throat:      Mouth: Mucous membranes are moist.   Eyes:      General: No scleral icterus.     Conjunctiva/sclera: Conjunctivae normal.   Cardiovascular:      Rate and Rhythm: Normal rate and regular rhythm.   Pulmonary:      Effort: Pulmonary effort is normal. No respiratory distress.      Breath sounds: No wheezing.   Abdominal:      Palpations: Abdomen is soft.   Musculoskeletal:         General: No deformity or signs of injury.   Skin:     General: Skin is warm and dry.   Neurological:      General: No focal deficit present.      Mental Status: She is alert and oriented to person, place, and time.   Psychiatric:         Mood and Affect: Mood normal.         Behavior: Behavior normal.       Medications  Current Facility-Administered Medications   Medication Dose Route Frequency Provider Last Rate Last Admin    Nozin nasal  swab  1 Applicator Each Nostril BID Sheryl Townsend D.O.   1 Applicator at 04/10/23 0624    diphenhydrAMINE (BENADRYL) tablet/capsule 25 mg  25 mg Oral Q12HRS PRN Radha Tilley M.D.   25 mg at 04/09/23 1656    thiamine (B-1) 250 mg in dextrose 5% 100 mL IVPB  250 mg Intravenous Q8HRS Keo Mcintosh M.D. 200 mL/hr at 04/10/23 0552 250 mg at 04/10/23 0552    senna-docusate (PERICOLACE or SENOKOT S) 8.6-50 MG per tablet 2 Tablet  2 Tablet Oral BID Charlie Barnard D.O.   2 Tablet at 04/08/23 1726    And    polyethylene glycol/lytes (MIRALAX) PACKET 1 Packet  1 Packet Oral QDAY PRN Charlie Barnard D.O.        And    magnesium hydroxide (MILK OF MAGNESIA) suspension 30 mL  30 mL Oral QDAY PRN Charlie Barnard D.O.        And    bisacodyl (DULCOLAX) suppository 10 mg  10 mg Rectal QDAY PRN Charlie Barnard D.O.        rivaroxaban (XARELTO) tablet 10 mg  10 mg Oral  DAILY AT 1800 Charlie Barnard D.O.   10 mg at 04/09/23 1656    acetaminophen (Tylenol) tablet 650 mg  650 mg Oral Q6HRS PRN Charlie Barnard D.O.        multivitamin tablet 1 Tablet  1 Tablet Oral DAILY Sheryl Townsend D.O.   1 Tablet at 04/10/23 0624       Fluids    Intake/Output Summary (Last 24 hours) at 4/10/2023 0633  Last data filed at 4/10/2023 0200  Gross per 24 hour   Intake 3636.77 ml   Output 1400 ml   Net 2236.77 ml         Laboratory      Recent Labs     04/08/23  2100 04/09/23  0500 04/09/23 0919   CPKTOTAL 5637* 4029* 3280*       Recent Labs     04/08/23  1330 04/08/23  1559 04/09/23  0100 04/09/23  0500 04/09/23  0919 04/09/23  1806 04/09/23  2215 04/10/23  0200   SODIUM 102*   < > 105* 108*   < > 118* 119* 119*   POTASSIUM 2.7*   < > 3.7 3.9  --   --  3.7  --    CHLORIDE <60*   < > 67* 72*  --   --  84*  --    CO2 24   < > 22 21  --   --  21  --    BUN 19   < > 13 13  --   --  9  --    CREATININE 0.69   < > 0.31* 0.38*  --   --  0.45*  --    MAGNESIUM 1.4*  --  2.4  --   --   --   --   --    PHOSPHORUS  --   --  2.3*  --   --   --   --   --    CALCIUM 9.6   < > 8.5 8.2*  --   --  8.6  --     < > = values in this interval not displayed.       Recent Labs     04/08/23  1330 04/08/23  1559 04/09/23  0100 04/09/23  0500 04/09/23  2215   ALTSGPT 64*  --  58* 56*  --    ASTSGOT 203*  --  177* 165*  --    ALKPHOSPHAT 92  --  75 74  --    TBILIRUBIN 1.2  --  0.9 0.9  --    GLUCOSE 109*   < > 88 84 121*    < > = values in this interval not displayed.       Recent Labs     04/08/23  1330 04/08/23  1636 04/09/23  0100 04/09/23  0500   WBC  --  14.7* 12.1*  --    NEUTSPOLYS  --  84.00* 77.80*  --    LYMPHOCYTES  --  8.40* 11.60*  --    MONOCYTES  --  7.60 9.00  --    EOSINOPHILS  --  0.00 0.50  --    BASOPHILS  --  0.00 0.20  --    ASTSGOT 203*  --  177* 165*   ALTSGPT 64*  --  58* 56*   ALKPHOSPHAT 92  --  75 74   TBILIRUBIN 1.2  --  0.9 0.9       Recent Labs     04/08/23  1330 04/08/23  1636 04/09/23  0100    RBC  --  3.85* 3.99*   HEMOGLOBIN  --  12.5 13.4   HEMATOCRIT  --  33.5* 35.2*   PLATELETCT  --  283 240   PROTHROMBTM 13.3  --   --    APTT 29.8  --   --    INR 1.02  --   --          Imaging  Reviewed     Assessment/Plan  #Severe hyponatremia - likely from tea/toast diet and possible alcoholism   #Hypomagnesemia  #Hypokalemia  #Rhabdomyolysis 2/2 prolonged immobility  #Lytic bone lesions - possible underlying metastatic process  #Ground level fall  #ETOH abuse     Discussion: Her sodium has corrected by 18 points over 48 hours.  This is acceptable, but on the high end of acceptable.  All fluids have been stopped.  She did get D5 overnight from the overnight intensivist.  She is alert and oriented x4 and walking around.  She is on an oral diet.  Given her rapid correction, and the fact that she was also hypokalemic and hypochloremic, is very likely that she had a dietary cause for her presentation.  I have discussed her care with Dr. Smith.  He has excepted her for downgrade to [facility].  We will continue to watch the sodium every 4 hours for today.  Remainder of the work-up I defer to the hospitalist team.  She will likely need an outpatient follow-up for the reported lytic bone lesions.    Total consult time: 45 minutes which included time spent on chart review, personally reviewing pertinent images and labs, time spent counseling and educating the patient and/or family members, and coordinating care with the healthcare team to include consultants.     Disposition: Downgrade to [facility]    Rico Lopez DO  Staff Pulmonologist and Intensivist  [facility]     Please note that this dictation was created using voice recognition software. The accuracy of the dictation is limited to the abilities of the software. I have made every reasonable attempt to correct obvious errors, but I expect that there are errors of grammar and possibly content that I did not discover before finalizing the note.

## 2023-04-11 LAB
CK SERPL-CCNC: 1346 U/L (ref 0–154)
SODIUM SERPL-SCNC: 123 MMOL/L (ref 135–145)
SODIUM SERPL-SCNC: 126 MMOL/L (ref 135–145)
SODIUM SERPL-SCNC: 127 MMOL/L (ref 135–145)
SODIUM SERPL-SCNC: NORMAL MMOL/L (ref 135–145)
TEST NAME 95000: ABNORMAL
TEST NAME 95000: NORMAL

## 2023-04-11 PROCEDURE — A9270 NON-COVERED ITEM OR SERVICE: HCPCS | Performed by: INTERNAL MEDICINE

## 2023-04-11 PROCEDURE — A9270 NON-COVERED ITEM OR SERVICE: HCPCS | Performed by: STUDENT IN AN ORGANIZED HEALTH CARE EDUCATION/TRAINING PROGRAM

## 2023-04-11 PROCEDURE — 99233 SBSQ HOSP IP/OBS HIGH 50: CPT | Performed by: HOSPITALIST

## 2023-04-11 PROCEDURE — 700101 HCHG RX REV CODE 250: Performed by: HOSPITALIST

## 2023-04-11 PROCEDURE — 82550 ASSAY OF CK (CPK): CPT

## 2023-04-11 PROCEDURE — 770000 HCHG ROOM/CARE - INTERMEDIATE ICU *

## 2023-04-11 PROCEDURE — 700105 HCHG RX REV CODE 258: Performed by: INTERNAL MEDICINE

## 2023-04-11 PROCEDURE — 84295 ASSAY OF SERUM SODIUM: CPT

## 2023-04-11 PROCEDURE — 700111 HCHG RX REV CODE 636 W/ 250 OVERRIDE (IP): Performed by: INTERNAL MEDICINE

## 2023-04-11 PROCEDURE — 700102 HCHG RX REV CODE 250 W/ 637 OVERRIDE(OP): Performed by: STUDENT IN AN ORGANIZED HEALTH CARE EDUCATION/TRAINING PROGRAM

## 2023-04-11 PROCEDURE — 700102 HCHG RX REV CODE 250 W/ 637 OVERRIDE(OP): Performed by: HOSPITALIST

## 2023-04-11 PROCEDURE — 700102 HCHG RX REV CODE 250 W/ 637 OVERRIDE(OP): Performed by: INTERNAL MEDICINE

## 2023-04-11 PROCEDURE — A9270 NON-COVERED ITEM OR SERVICE: HCPCS | Performed by: HOSPITALIST

## 2023-04-11 RX ORDER — DEXTROSE MONOHYDRATE, SODIUM CHLORIDE, AND POTASSIUM CHLORIDE 50; 1.49; 4.5 G/1000ML; G/1000ML; G/1000ML
INJECTION, SOLUTION INTRAVENOUS CONTINUOUS
Status: DISCONTINUED | OUTPATIENT
Start: 2023-04-11 | End: 2023-04-11

## 2023-04-11 RX ORDER — ESCITALOPRAM OXALATE 10 MG/1
20 TABLET ORAL DAILY
Status: DISCONTINUED | OUTPATIENT
Start: 2023-04-11 | End: 2023-04-13 | Stop reason: HOSPADM

## 2023-04-11 RX ORDER — BUDESONIDE AND FORMOTEROL FUMARATE DIHYDRATE 160; 4.5 UG/1; UG/1
2 AEROSOL RESPIRATORY (INHALATION) 2 TIMES DAILY
Status: DISCONTINUED | OUTPATIENT
Start: 2023-04-11 | End: 2023-04-13 | Stop reason: HOSPADM

## 2023-04-11 RX ORDER — DEXTROSE MONOHYDRATE, SODIUM CHLORIDE, AND POTASSIUM CHLORIDE 50; 1.49; 2.25 G/1000ML; G/1000ML; G/1000ML
INJECTION, SOLUTION INTRAVENOUS CONTINUOUS
Status: DISCONTINUED | OUTPATIENT
Start: 2023-04-11 | End: 2023-04-12

## 2023-04-11 RX ADMIN — THIAMINE HYDROCHLORIDE 250 MG: 100 INJECTION, SOLUTION INTRAMUSCULAR; INTRAVENOUS at 09:51

## 2023-04-11 RX ADMIN — RIVAROXABAN 10 MG: 10 TABLET, FILM COATED ORAL at 17:29

## 2023-04-11 RX ADMIN — THIAMINE HYDROCHLORIDE 250 MG: 100 INJECTION, SOLUTION INTRAMUSCULAR; INTRAVENOUS at 14:00

## 2023-04-11 RX ADMIN — SENNOSIDES AND DOCUSATE SODIUM 2 TABLET: 50; 8.6 TABLET ORAL at 17:29

## 2023-04-11 RX ADMIN — THERA TABS 1 TABLET: TAB at 06:35

## 2023-04-11 RX ADMIN — DEXTROSE AND SODIUM CHLORIDE: 5; 200 INJECTION, SOLUTION INTRAVENOUS at 23:14

## 2023-04-11 RX ADMIN — SENNOSIDES AND DOCUSATE SODIUM 2 TABLET: 50; 8.6 TABLET ORAL at 10:01

## 2023-04-11 RX ADMIN — DEXTROSE AND SODIUM CHLORIDE: 5; 200 INJECTION, SOLUTION INTRAVENOUS at 06:36

## 2023-04-11 RX ADMIN — THIAMINE HYDROCHLORIDE 250 MG: 100 INJECTION, SOLUTION INTRAMUSCULAR; INTRAVENOUS at 00:29

## 2023-04-11 RX ADMIN — BUDESONIDE AND FORMOTEROL FUMARATE DIHYDRATE 2 PUFF: 160; 4.5 AEROSOL RESPIRATORY (INHALATION) at 17:29

## 2023-04-11 RX ADMIN — THIAMINE HYDROCHLORIDE 250 MG: 100 INJECTION, SOLUTION INTRAMUSCULAR; INTRAVENOUS at 23:14

## 2023-04-11 RX ADMIN — ESCITALOPRAM OXALATE 20 MG: 10 TABLET ORAL at 13:45

## 2023-04-11 ASSESSMENT — PAIN DESCRIPTION - PAIN TYPE
TYPE: ACUTE PAIN

## 2023-04-11 NOTE — PROGRESS NOTES
"Hospital Medicine Daily Progress Note    Date of Service  4/11/2023    Chief Complaint  Farzana Fragoso is a 62 y.o. female admitted 4/8/2023 with altered mental status, hyponatremia    Hospital Course  62 y.o. female who presented 4/8/2023 with altered mental status.  Ms. Fragoso has no present no medical conditions no takes Lexapro for mild depression that sustained a fall in bed was too weak to get up.  Her friend came by and found her on the floor.  Upon questioning her friend who is at bedside states that patient had been texting her and they were illegible texts.  She called paramedics and in the emergency room she was found to have a sodium of 102 and repeat was 101.  She was admitted to the intensive care unit on half-normal saline.  She was also found to be in rhabdomyolysis and her CPKs were trended.    Interval Problem Update  Patient seen and examined today.  Data, Medication data reviewed.  Case discussed with nursing as available.  Plan of Care reviewed with patient and notified of changes.   4/10/2023: Patient seen and evaluated in the IMCU.  Her sodium is now 123 and she is on fluid restriction.  Upon questioning she states she drinks water \"all day\" and was never told that there could be side effects to this.  Been on discussion about limiting to somewhere between 2 and 3 L of fluids per day at home.  4/11 the patient appears improved, she is alert and oriented x4, she is nonfocal, she does have complaints of poor sleep, insomnia  Patient has multiple bruises and lacerations being treated by nursing,  Sodium levels have been fluctuating, sodium adjustment has been rendered overnight, follow-up sodium level found significantly low at 113, repeat labs is currently underway      I have discussed this patient's plan of care and discharge plan at IDT rounds today with Case Management, Nursing, Nursing leadership, and other members of the IDT team.    Consultants/Specialty  critical care    Code " Status  Full Code    Disposition  Patient is not medically cleared for discharge.   Anticipate discharge to to home with close outpatient follow-up.  I have placed the appropriate orders for post-discharge needs.    Review of Systems  ROS     Physical Exam  Temp:  [36.3 °C (97.4 °F)-37.2 °C (98.9 °F)] 36.3 °C (97.4 °F)  Pulse:  [] 76  Resp:  [12-30] 12  BP: ()/(49-74) 104/63  SpO2:  [80 %-97 %] 93 %    Physical Exam  Vitals and nursing note reviewed.   Constitutional:       Appearance: She is well-developed. She is not diaphoretic.   HENT:      Head: Normocephalic and atraumatic.      Nose: Nose normal.   Eyes:      Conjunctiva/sclera: Conjunctivae normal.      Pupils: Pupils are equal, round, and reactive to light.      Comments: Left periorbital hematoma   Neck:      Thyroid: No thyromegaly.      Vascular: No JVD.   Cardiovascular:      Rate and Rhythm: Normal rate and regular rhythm.      Heart sounds: Normal heart sounds.     No friction rub. No gallop.   Pulmonary:      Effort: Pulmonary effort is normal.      Breath sounds: Normal breath sounds. No wheezing or rales.   Abdominal:      General: Bowel sounds are normal. There is no distension.      Palpations: Abdomen is soft. There is no mass.      Tenderness: There is no abdominal tenderness. There is no guarding or rebound.   Musculoskeletal:         General: No tenderness. Normal range of motion.      Cervical back: Normal range of motion and neck supple.   Lymphadenopathy:      Cervical: No cervical adenopathy.   Skin:     General: Skin is warm and dry.      Findings: Bruising, erythema and lesion present.   Neurological:      Mental Status: She is alert and oriented to person, place, and time.      Cranial Nerves: No cranial nerve deficit.      Motor: Weakness present.   Psychiatric:         Behavior: Behavior normal.       Fluids    Intake/Output Summary (Last 24 hours) at 4/11/2023 9065  Last data filed at 4/11/2023 0703  Gross per 24 hour    Intake 86.25 ml   Output 2300 ml   Net -2213.75 ml       Laboratory  Recent Labs     04/08/23  1636 04/09/23  0100 04/10/23  0620   WBC 14.7* 12.1* 9.3   RBC 3.85* 3.99* 3.78*   HEMOGLOBIN 12.5 13.4 12.6   HEMATOCRIT 33.5* 35.2* 35.5*   MCV 87.0 88.2 93.9   MCH 32.5 33.6* 33.3*   MCHC 37.3* 37.4* 35.5*   RDW 40.5 40.5 44.1   PLATELETCT 283 240 253   MPV 9.7 9.9 10.3     Recent Labs     04/09/23  0500 04/09/23  0919 04/09/23  2215 04/10/23  0200 04/10/23  0620 04/10/23  1022 04/10/23  2030 04/11/23  0216   SODIUM 108*   < > 119*   < > 119* 122* 124* 123*   POTASSIUM 3.9  --  3.7  --  3.4*  --   --   --    CHLORIDE 72*  --  84*  --  85*  --   --   --    CO2 21  --  21  --  23  --   --   --    GLUCOSE 84  --  121*  --  123*  --   --   --    BUN 13  --  9  --  7*  --   --   --    CREATININE 0.38*  --  0.45*  --  0.40*  --   --   --    CALCIUM 8.2*  --  8.6  --  8.3*  --   --   --     < > = values in this interval not displayed.     Recent Labs     04/08/23  1330   APTT 29.8   INR 1.02               Imaging  DX-SHOULDER 2+ RIGHT   Final Result      1.  No fracture or dislocation of RIGHT shoulder.   2.  Convex lesion at the medial aspect RIGHT proximal humeral shaft most likely sessile osteochondroma.      CT-CSPINE WITHOUT PLUS RECONS   Final Result      Degenerative change without evidence of cervical spine fracture.      CT-HEAD W/O   Final Result      Head CT without contrast within normal limits. No evidence of acute cerebral infarction, hemorrhage or mass lesion.         DX-CHEST-PORTABLE (1 VIEW)   Final Result      1.  No acute cardiopulmonary disease.   2.  Expansile lytic lesion in the RIGHT humeral shaft, of uncertain etiology.           Assessment/Plan  * Hyponatremia- (present on admission)  Assessment & Plan  Severe hyponatremia at 101 that was very symptomatic.  She was admitted to the intensive care unit and treated with multiple variations of fluids now up to 123.  IV fluids will be stopped and she  "will be placed on a fluid restricted diet.  Upon review of her outpatient medications she is not on diuretics.  She is on Lexapro which is relatively contraindicated though I suspect this is not the source of her hyponatremia as she admits to very severe polydipsia and drinks water \"all day\".  Serial sodiums every 6 hours has been ordered.  Once her sodium improves I think it is reasonable to restart the Lexapro so she does not withdraw from this.  Long discussion was had about restricting to normal amount of water such as 2 to 3 L a day at home which will be difficult for her but she understands the need for this.    Encephalopathy, metabolic- (present on admission)  Assessment & Plan  Secondary to severe hyponatremia  This has resolved    Lytic bone lesions on xray- (present on admission)  Assessment & Plan  Xray of the shoulder ordered.    Hypomagnesemia- (present on admission)  Assessment & Plan  Replacement given    Hypokalemia- (present on admission)  Assessment & Plan  Replacement given    Rhabdomyolysis- (present on admission)  Assessment & Plan  CPK was 6,800 and has come down to 1,500 with IV fluids which are being held    Plan  Ongoing diligent sodium correction  Monitor closely every 6 hours  Adjust as indicated  The patient on diligent IV medication regimen with frequent checks,  Medically complex high risk  See orders      VTE prophylaxis: Xarelto 10 mg daily as prophylaxis    I have performed a physical exam and reviewed and updated ROS and Plan today (4/11/2023). In review of yesterday's note (4/10/2023), there are no changes except as documented above.    Please note that this dictation was created using voice recognition software. I have made every reasonable attempt to correct obvious errors, but I expect that there are errors of grammar and possibly context that I did not discover before finalizing the note.         "

## 2023-04-11 NOTE — PROGRESS NOTES
NOC cross provider note:     I was paged regarding sodium result of 124, concern for fast overcorrection and concern for CPM.  Patient has sodium as low as 102 on 4/8/2023 at 1330, plan for slow correction with target goal of 6 to 8 mEq/day.  Patient had tolerated goal of 120, however noted to have 122 earlier today 4/10/2023 at 10:22.  Patient has been treated with D5W infusion at 75 cc/h.  Latest repeat sodium available was 124 at 20:30 4/10/2023.    Impression:  Profound hyponatremia, symptomatic  Metabolic encephalopathy    Plan:  Bolus D5W 500 cc  Increase D5W infusion rate 150 cc/h  Repeat sodium at 02:00 on 4/13/2023 --May consider infusing DDAVP if still correcting fast  Serial neuro exam to ensure no CPM  Patient seen by me in IMCU, discussed care plan with patient/RN/pharmacy    Critical care time: 32 minutes spent in chart review, aggressive medical management, coordinating care with patient/RN/ancillary staff/pharmacy

## 2023-04-11 NOTE — CARE PLAN
The patient is Watcher - Medium risk of patient condition declining or worsening    Shift Goals  Clinical Goals: sodium within goal  Patient Goals: sleep  Family Goals: barbara    Progress made toward(s) clinical / shift goals:    Problem: Knowledge Deficit - Standard  Goal: Patient and family/care givers will demonstrate understanding of plan of care, disease process/condition, diagnostic tests and medications  Outcome: Progressing     Problem: Fall Risk  Goal: Patient will remain free from falls  Outcome: Progressing     Problem: Pain - Standard  Goal: Alleviation of pain or a reduction in pain to the patient’s comfort goal  Outcome: Progressing       Patient is not progressing towards the following goals:

## 2023-04-11 NOTE — PROGRESS NOTES
Notified Dr. Mcdowell pts Na came back at 124, per nursing communication goal for tonight is 115-120. Orders received for 500cc D5 bolus and rate increased to 150ml/hr on continuous gtt. Next Na recheck is 0200.     0400 Sodium recheck came back as 123. Dr. Brush notified. No new orders.

## 2023-04-11 NOTE — PROGRESS NOTES
Nurse informed me that the patient's sodium level is 123.  Patient was given a bolus of D5 earlier.  Sodium is now stabilizing and decreasing.  I will transition her now to D5 half NS with potassium going 83 an hour.  We will continue to trend sodium levels.

## 2023-04-12 LAB
ALBUMIN SERPL BCP-MCNC: 3.7 G/DL (ref 3.2–4.9)
ALBUMIN/GLOB SERPL: 1.2 G/DL
ALP SERPL-CCNC: 76 U/L (ref 30–99)
ALT SERPL-CCNC: 51 U/L (ref 2–50)
ANION GAP SERPL CALC-SCNC: 16 MMOL/L (ref 7–16)
ANISOCYTOSIS BLD QL SMEAR: ABNORMAL
AST SERPL-CCNC: 62 U/L (ref 12–45)
BASOPHILS # BLD AUTO: 0 % (ref 0–1.8)
BASOPHILS # BLD: 0 K/UL (ref 0–0.12)
BILIRUB SERPL-MCNC: 0.2 MG/DL (ref 0.1–1.5)
BUN SERPL-MCNC: 9 MG/DL (ref 8–22)
CALCIUM ALBUM COR SERPL-MCNC: 9.4 MG/DL (ref 8.5–10.5)
CALCIUM SERPL-MCNC: 9.2 MG/DL (ref 8.5–10.5)
CHLORIDE SERPL-SCNC: 97 MMOL/L (ref 96–112)
CK SERPL-CCNC: 600 U/L (ref 0–154)
CO2 SERPL-SCNC: 20 MMOL/L (ref 20–33)
CORTIS SERPL-MCNC: 12.4 UG/DL (ref 0–23)
CREAT SERPL-MCNC: 0.5 MG/DL (ref 0.5–1.4)
EOSINOPHIL # BLD AUTO: 0.44 K/UL (ref 0–0.51)
EOSINOPHIL NFR BLD: 4.2 % (ref 0–6.9)
ERYTHROCYTE [DISTWIDTH] IN BLOOD BY AUTOMATED COUNT: 47.2 FL (ref 35.9–50)
GFR SERPLBLD CREATININE-BSD FMLA CKD-EPI: 106 ML/MIN/1.73 M 2
GLOBULIN SER CALC-MCNC: 3.1 G/DL (ref 1.9–3.5)
GLUCOSE SERPL-MCNC: 97 MG/DL (ref 65–99)
HCT VFR BLD AUTO: 35.6 % (ref 37–47)
HGB BLD-MCNC: 12.2 G/DL (ref 12–16)
LYMPHOCYTES # BLD AUTO: 2.67 K/UL (ref 1–4.8)
LYMPHOCYTES NFR BLD: 25.4 % (ref 22–41)
MACROCYTES BLD QL SMEAR: ABNORMAL
MAGNESIUM SERPL-MCNC: 1.8 MG/DL (ref 1.5–2.5)
MANUAL DIFF BLD: NORMAL
MCH RBC QN AUTO: 33.2 PG (ref 27–33)
MCHC RBC AUTO-ENTMCNC: 34.3 G/DL (ref 33.6–35)
MCV RBC AUTO: 97 FL (ref 81.4–97.8)
METAMYELOCYTES NFR BLD MANUAL: 0.9 %
MONOCYTES # BLD AUTO: 0.71 K/UL (ref 0–0.85)
MONOCYTES NFR BLD AUTO: 6.8 % (ref 0–13.4)
MORPHOLOGY BLD-IMP: NORMAL
MYELOCYTES NFR BLD MANUAL: 1.7 %
NEUTROPHILS # BLD AUTO: 6.41 K/UL (ref 2–7.15)
NEUTROPHILS NFR BLD: 57.6 % (ref 44–72)
NEUTS BAND NFR BLD MANUAL: 3.4 % (ref 0–10)
NRBC # BLD AUTO: 0 K/UL
NRBC BLD-RTO: 0 /100 WBC
PHOSPHATE SERPL-MCNC: 3.6 MG/DL (ref 2.5–4.5)
PLATELET # BLD AUTO: 294 K/UL (ref 164–446)
PLATELET BLD QL SMEAR: NORMAL
PMV BLD AUTO: 9.8 FL (ref 9–12.9)
POIKILOCYTOSIS BLD QL SMEAR: NORMAL
POTASSIUM SERPL-SCNC: 4.9 MMOL/L (ref 3.6–5.5)
PROT SERPL-MCNC: 6.8 G/DL (ref 6–8.2)
RBC # BLD AUTO: 3.67 M/UL (ref 4.2–5.4)
RBC BLD AUTO: PRESENT
SODIUM SERPL-SCNC: 128 MMOL/L (ref 135–145)
SODIUM SERPL-SCNC: 130 MMOL/L (ref 135–145)
SODIUM SERPL-SCNC: 133 MMOL/L (ref 135–145)
TARGETS BLD QL SMEAR: NORMAL
TSH SERPL DL<=0.005 MIU/L-ACNC: 1.58 UIU/ML (ref 0.38–5.33)
WBC # BLD AUTO: 10.5 K/UL (ref 4.8–10.8)

## 2023-04-12 PROCEDURE — 700111 HCHG RX REV CODE 636 W/ 250 OVERRIDE (IP): Performed by: INTERNAL MEDICINE

## 2023-04-12 PROCEDURE — 700102 HCHG RX REV CODE 250 W/ 637 OVERRIDE(OP): Performed by: INTERNAL MEDICINE

## 2023-04-12 PROCEDURE — 82533 TOTAL CORTISOL: CPT

## 2023-04-12 PROCEDURE — 85007 BL SMEAR W/DIFF WBC COUNT: CPT

## 2023-04-12 PROCEDURE — 84100 ASSAY OF PHOSPHORUS: CPT

## 2023-04-12 PROCEDURE — 700102 HCHG RX REV CODE 250 W/ 637 OVERRIDE(OP): Performed by: HOSPITALIST

## 2023-04-12 PROCEDURE — 82550 ASSAY OF CK (CPK): CPT

## 2023-04-12 PROCEDURE — 770006 HCHG ROOM/CARE - MED/SURG/GYN SEMI*

## 2023-04-12 PROCEDURE — 97535 SELF CARE MNGMENT TRAINING: CPT | Mod: CQ

## 2023-04-12 PROCEDURE — A9270 NON-COVERED ITEM OR SERVICE: HCPCS | Performed by: HOSPITALIST

## 2023-04-12 PROCEDURE — 700101 HCHG RX REV CODE 250: Performed by: HOSPITALIST

## 2023-04-12 PROCEDURE — 84295 ASSAY OF SERUM SODIUM: CPT

## 2023-04-12 PROCEDURE — 700105 HCHG RX REV CODE 258: Performed by: INTERNAL MEDICINE

## 2023-04-12 PROCEDURE — 700102 HCHG RX REV CODE 250 W/ 637 OVERRIDE(OP): Performed by: STUDENT IN AN ORGANIZED HEALTH CARE EDUCATION/TRAINING PROGRAM

## 2023-04-12 PROCEDURE — A9270 NON-COVERED ITEM OR SERVICE: HCPCS | Performed by: STUDENT IN AN ORGANIZED HEALTH CARE EDUCATION/TRAINING PROGRAM

## 2023-04-12 PROCEDURE — A9270 NON-COVERED ITEM OR SERVICE: HCPCS | Performed by: INTERNAL MEDICINE

## 2023-04-12 PROCEDURE — 99232 SBSQ HOSP IP/OBS MODERATE 35: CPT | Performed by: HOSPITALIST

## 2023-04-12 PROCEDURE — 97116 GAIT TRAINING THERAPY: CPT | Mod: CQ

## 2023-04-12 PROCEDURE — 83735 ASSAY OF MAGNESIUM: CPT

## 2023-04-12 PROCEDURE — 85025 COMPLETE CBC W/AUTO DIFF WBC: CPT

## 2023-04-12 PROCEDURE — 84443 ASSAY THYROID STIM HORMONE: CPT

## 2023-04-12 PROCEDURE — 80053 COMPREHEN METABOLIC PANEL: CPT

## 2023-04-12 RX ADMIN — BUDESONIDE AND FORMOTEROL FUMARATE DIHYDRATE 2 PUFF: 160; 4.5 AEROSOL RESPIRATORY (INHALATION) at 05:55

## 2023-04-12 RX ADMIN — SENNOSIDES AND DOCUSATE SODIUM 2 TABLET: 50; 8.6 TABLET ORAL at 05:55

## 2023-04-12 RX ADMIN — BUDESONIDE AND FORMOTEROL FUMARATE DIHYDRATE 2 PUFF: 160; 4.5 AEROSOL RESPIRATORY (INHALATION) at 18:00

## 2023-04-12 RX ADMIN — ESCITALOPRAM OXALATE 20 MG: 10 TABLET ORAL at 05:55

## 2023-04-12 RX ADMIN — THIAMINE HYDROCHLORIDE 250 MG: 100 INJECTION, SOLUTION INTRAMUSCULAR; INTRAVENOUS at 16:15

## 2023-04-12 RX ADMIN — THIAMINE HYDROCHLORIDE 250 MG: 100 INJECTION, SOLUTION INTRAMUSCULAR; INTRAVENOUS at 05:57

## 2023-04-12 RX ADMIN — THERA TABS 1 TABLET: TAB at 05:55

## 2023-04-12 RX ADMIN — DEXTROSE AND SODIUM CHLORIDE: 5; 200 INJECTION, SOLUTION INTRAVENOUS at 00:28

## 2023-04-12 RX ADMIN — RIVAROXABAN 10 MG: 10 TABLET, FILM COATED ORAL at 18:08

## 2023-04-12 ASSESSMENT — COGNITIVE AND FUNCTIONAL STATUS - GENERAL
CLIMB 3 TO 5 STEPS WITH RAILING: A LITTLE
STANDING UP FROM CHAIR USING ARMS: A LITTLE
SUGGESTED CMS G CODE MODIFIER MOBILITY: CJ
MOBILITY SCORE: 21
WALKING IN HOSPITAL ROOM: A LITTLE

## 2023-04-12 ASSESSMENT — PAIN DESCRIPTION - PAIN TYPE
TYPE: ACUTE PAIN

## 2023-04-12 ASSESSMENT — ENCOUNTER SYMPTOMS
NERVOUS/ANXIOUS: 1
BACK PAIN: 0
WEAKNESS: 0
BRUISES/BLEEDS EASILY: 0
CHILLS: 0
NECK PAIN: 0
RESPIRATORY NEGATIVE: 1
NAUSEA: 0
VOMITING: 0
POLYDIPSIA: 0
NEUROLOGICAL NEGATIVE: 1
EYES NEGATIVE: 1
MUSCULOSKELETAL NEGATIVE: 1
COUGH: 0
HEADACHES: 0
HEARTBURN: 0
FEVER: 0
DEPRESSION: 0
GASTROINTESTINAL NEGATIVE: 1
DIZZINESS: 0
PALPITATIONS: 0
CARDIOVASCULAR NEGATIVE: 1
FOCAL WEAKNESS: 0

## 2023-04-12 ASSESSMENT — GAIT ASSESSMENTS
GAIT LEVEL OF ASSIST: SUPERVISED
ASSISTIVE DEVICE: FRONT WHEEL WALKER
DISTANCE (FEET): 150

## 2023-04-12 ASSESSMENT — FIBROSIS 4 INDEX: FIB4 SCORE: 5.4

## 2023-04-12 NOTE — DISCHARGE PLANNING
Received Choice Form @: 929  Agency/ Facility Name: [facility]   Referral Sent per Choice Form @: Not sent as there are no orders or F2F

## 2023-04-12 NOTE — THERAPY
Physical Therapy   Daily Treatment     Patient Name: Farzana Fragoso  Age:  62 y.o., Sex:  female  Medical Record #: [id number]  Today's Date: 4/12/2023     Precautions  Precautions: (P) Fall Risk    Assessment    Nrsg has cleared pt to participate w/PT, the pt is up self around her room w/FWW. The pt willing to participate w/PT, anxious to go home.   The pt is functioning @ sup->indep level w/bed mobility, transfers, and amb w/FWW tolerated 150'. The pt is dc'd from PT services @ this time, all PT goals have been met.   The pt is cleared for d/c home from PT standpoint w/HHS and FWW.      Plan    Treatment Plan Status: (P) Modify Current Treatment Plan  Type of Treatment: Gait Training, Neuro Re-Education / Balance, Self Care / Home Evaluation, Therapeutic Activities, Therapeutic Exercise  Treatment Frequency: 3 Times per Week  Treatment Duration: Until Therapy Goals Met    DC Equipment Recommendations: (P) Front-Wheel Walker  Discharge Recommendations: (P) Recommend home health for continued physical therapy services    Objective       04/12/23 1525   Charge Group   PT Gait Training (Units) 1   PT Self Care / Home Evaluation (Units) 1   Total Time Spent   PT Gait Training Time Spent (Mins) 10   PT Self Care/Home Evaluation Time Spent (Mins) 20   Precautions   Precautions Fall Risk   Pain 0 - 10 Group   Pain Rating Scale (NPRS) 0   Other Treatments   Other Treatments Provided Pt educated on the importance of using her walker around her apartment, have her friend w/her when amb outside, and to keep her grocery trips @ minimum until she builds her strength and activity tolerance back to baseline. The pt receptive to the education.   Balance   Sitting Balance (Static) Good   Sitting Balance (Dynamic) Good   Standing Balance (Static) Fair   Standing Balance (Dynamic) Fair   Weight Shift Sitting Good   Weight Shift Standing Fair   Bed Mobility    Supine to Sit Modified Independent   Sit to Supine Modified Independent    Scooting Modified Independent   Rolling Modified Independent   Gait Analysis   Gait Level Of Assist Supervised   Assistive Device Front Wheel Walker   Distance (Feet) 150   Comments Improvement w/pts amb efforts from last PT session. The pt managed hallway distances 150'.   Functional Mobility   Sit to Stand Supervised   Bed, Chair, Wheelchair Transfer Supervised   Toilet Transfers Supervised   Comments The pt is up self around her room w/FWW.   How much difficulty does the patient currently have...   Turning over in bed (including adjusting bedclothes, sheets and blankets)? 4   Sitting down on and standing up from a chair with arms (e.g., wheelchair, bedside commode, etc.) 4   Moving from lying on back to sitting on the side of the bed? 4   How much help from another person does the patient currently need...   Moving to and from a bed to a chair (including a wheelchair)? 3   Need to walk in a hospital room? 3   Climbing 3-5 steps with a railing? 3   6 clicks Mobility Score 21   Short Term Goals    Short Term Goal # 1 Pt will transfer with FWW and supervision to progress function in 6 visits.   Goal Outcome # 1 Goal met   Short Term Goal # 2 Pt will ambulate 150ft with FWW and supervision to progress function in 6 visits.   Goal Outcome # 2 Goal met   Education Group   Role of Physical Therapist Patient Response Patient;Acceptance;Explanation;Action Demonstration   Physical Therapy Treatment Plan   Physical Therapy Treatment Plan Modify Current Treatment Plan   Reason For Discharge Discharge Secondary to Goals Met   Anticipated Discharge Equipment and Recommendations   DC Equipment Recommendations Front-Wheel Walker   Discharge Recommendations Recommend home health for continued physical therapy services   Interdisciplinary Plan of Care Collaboration   IDT Collaboration with  Nursing   Patient Position at End of Therapy In Bed;Call Light within Reach;Tray Table within Reach;Phone within Reach   Collaboration  Comments Nrsg notified of pts tx efforts   Session Information   Date / Session Number  Dc'd--4/12   Supervising Physical Therapist (PTA Treatments Only)   Supervising Physical Therapist Analy Rodriguez  (Supervising PT notified pt dc'd from PT services)

## 2023-04-12 NOTE — PROGRESS NOTES
"Hospital Medicine Daily Progress Note    Date of Service  4/12/2023    Chief Complaint  Farzana Fragoso is a 62 y.o. female admitted 4/8/2023 with altered mental status, hyponatremia    Hospital Course  62 y.o. female who presented 4/8/2023 with altered mental status.  Ms. Fragoso has no present no medical conditions no takes Lexapro for mild depression that sustained a fall in bed was too weak to get up.  Her friend came by and found her on the floor.  Upon questioning her friend who is at bedside states that patient had been texting her and they were illegible texts.  She called paramedics and in the emergency room she was found to have a sodium of 102 and repeat was 101.  She was admitted to the intensive care unit on half-normal saline.  She was also found to be in rhabdomyolysis and her CPKs were trended.    Interval Problem Update  Patient seen and examined today.  Data, Medication data reviewed.  Case discussed with nursing as available.  Plan of Care reviewed with patient and notified of changes.   4/10/2023: Patient seen and evaluated in the IMCU.  Her sodium is now 123 and she is on fluid restriction.  Upon questioning she states she drinks water \"all day\" and was never told that there could be side effects to this.  Been on discussion about limiting to somewhere between 2 and 3 L of fluids per day at home.  4/11 the patient appears improved, she is alert and oriented x4, she is nonfocal, she does have complaints of poor sleep, insomnia  Patient has multiple bruises and lacerations being treated by nursing,  Sodium levels have been fluctuating, sodium adjustment has been rendered overnight, follow-up sodium level found significantly low at 113, repeat labs is currently underway, then corrected to 127, likely lab error  4/12 the patient is improving, she states that she is pretty close to her baseline, her sodium is further improved now to 133, tolerating, stopping IV fluids,  Okay to transfer to medical " status and possibly discharged home,  Further observation of sodium levels needed to assure stability      I have discussed this patient's plan of care and discharge plan at IDT rounds today with Case Management, Nursing, Nursing leadership, and other members of the IDT team.    Consultants/Specialty  critical care    Code Status  Full Code    Disposition  Patient is not medically cleared for discharge.   Anticipate discharge to to home with close outpatient follow-up.  I have placed the appropriate orders for post-discharge needs.    Review of Systems  Review of Systems   Constitutional:  Positive for malaise/fatigue. Negative for chills and fever.   HENT: Negative.     Eyes: Negative.    Respiratory: Negative.  Negative for cough.    Cardiovascular: Negative.  Negative for chest pain and palpitations.   Gastrointestinal: Negative.  Negative for heartburn, nausea and vomiting.   Genitourinary: Negative.  Negative for dysuria and frequency.   Musculoskeletal: Negative.  Negative for back pain and neck pain.   Skin: Negative.  Negative for itching and rash.   Neurological: Negative.  Negative for dizziness, focal weakness, weakness and headaches.   Endo/Heme/Allergies: Negative.  Negative for polydipsia. Does not bruise/bleed easily.   Psychiatric/Behavioral:  Negative for depression. The patient is nervous/anxious.       Physical Exam  Temp:  [36.2 °C (97.2 °F)-36.7 °C (98 °F)] 36.7 °C (98 °F)  Pulse:  [71-95] 82  Resp:  [16-18] 16  BP: ()/(53-79) 119/66  SpO2:  [84 %-96 %] 94 %    Physical Exam  Vitals and nursing note reviewed.   Constitutional:       Appearance: She is well-developed. She is not diaphoretic.   HENT:      Head: Normocephalic and atraumatic.      Nose: Nose normal.   Eyes:      Conjunctiva/sclera: Conjunctivae normal.      Pupils: Pupils are equal, round, and reactive to light.      Comments: Left periorbital hematoma   Neck:      Thyroid: No thyromegaly.      Vascular: No JVD.    Cardiovascular:      Rate and Rhythm: Normal rate and regular rhythm.      Heart sounds: Normal heart sounds.     No friction rub. No gallop.   Pulmonary:      Effort: Pulmonary effort is normal.      Breath sounds: Normal breath sounds. No wheezing or rales.   Abdominal:      General: Bowel sounds are normal. There is no distension.      Palpations: Abdomen is soft. There is no mass.      Tenderness: There is no abdominal tenderness. There is no guarding or rebound.   Musculoskeletal:         General: No tenderness. Normal range of motion.      Cervical back: Normal range of motion and neck supple.   Lymphadenopathy:      Cervical: No cervical adenopathy.   Skin:     General: Skin is warm and dry.      Findings: Bruising, erythema and lesion present.   Neurological:      Mental Status: She is alert and oriented to person, place, and time.      Cranial Nerves: No cranial nerve deficit.      Motor: Weakness present.   Psychiatric:         Behavior: Behavior normal.       Fluids    Intake/Output Summary (Last 24 hours) at 4/12/2023 1019  Last data filed at 4/12/2023 0600  Gross per 24 hour   Intake 1365.79 ml   Output 2550 ml   Net -1184.21 ml         Laboratory  Recent Labs     04/10/23  0620 04/12/23  0325   WBC 9.3 10.5   RBC 3.78* 3.67*   HEMOGLOBIN 12.6 12.2   HEMATOCRIT 35.5* 35.6*   MCV 93.9 97.0   MCH 33.3* 33.2*   MCHC 35.5* 34.3   RDW 44.1 47.2   PLATELETCT 253 294   MPV 10.3 9.8       Recent Labs     04/09/23  2215 04/10/23  0200 04/10/23  0620 04/10/23  1022 04/11/23  1408 04/11/23 2014 04/12/23  0325   SODIUM 119*   < > 119*   < > 127* 126* 133*   POTASSIUM 3.7  --  3.4*  --   --   --  4.9   CHLORIDE 84*  --  85*  --   --   --  97   CO2 21 -- 23  --   --   --  20   GLUCOSE 121*  --  123*  --   --   --  97   BUN 9  --  7*  --   --   --  9   CREATININE 0.45*  --  0.40*  --   --   --  0.50   CALCIUM 8.6  --  8.3*  --   --   --  9.2    < > = values in this interval not displayed.                        Imaging  DX-SHOULDER 2+ RIGHT   Final Result      1.  No fracture or dislocation of RIGHT shoulder.   2.  Convex lesion at the medial aspect RIGHT proximal humeral shaft most likely sessile osteochondroma.      CT-CSPINE WITHOUT PLUS RECONS   Final Result      Degenerative change without evidence of cervical spine fracture.      CT-HEAD W/O   Final Result      Head CT without contrast within normal limits. No evidence of acute cerebral infarction, hemorrhage or mass lesion.         DX-CHEST-PORTABLE (1 VIEW)   Final Result      1.  No acute cardiopulmonary disease.   2.  Expansile lytic lesion in the RIGHT humeral shaft, of uncertain etiology.             Assessment/Plan  * Hyponatremia- (present on admission)  Assessment & Plan  Severe hyponatremia at 101 that was  symptomatic.  She was admitted to the intensive care unit and treated with multiple variations of fluids   The patient was slowly brought back to higher sodium levels, currently approaching normal range  The patient's symptoms have resolved, improved  Lexapro has been restarted  The patient will have to work on her overhydration, polydipsia        Encephalopathy, metabolic- (present on admission)  Assessment & Plan  Secondary to severe hyponatremia  This has resolved    Lytic bone lesions on xray- (present on admission)  Assessment & Plan  Xray of the shoulder suggesting a sessile osteochondroma    Ground-level fall- (present on admission)  Assessment & Plan  Superficial injuries, monitor    Hypomagnesemia- (present on admission)  Assessment & Plan  Monitor, continue to replace    Hypokalemia- (present on admission)  Assessment & Plan  Monitor, continue to replace    Rhabdomyolysis- (present on admission)  Assessment & Plan  CPK was 6,800 and has come down to 1,500  Status IV fluid hydration, monitor    Plan  Ongoing diligent sodium correction and monitoring  Stretch out sodium levels every 8 hours  Monitor closely for another 12 hours and then  possible discharge if further stabilized  Medically complex high risk  See orders      VTE prophylaxis: Xarelto 10 mg daily as prophylaxis    I have performed a physical exam and reviewed and updated ROS and Plan today (4/12/2023). In review of yesterday's note (4/11/2023), there are no changes except as documented above.    Please note that this dictation was created using voice recognition software. I have made every reasonable attempt to correct obvious errors, but I expect that there are errors of grammar and possibly context that I did not discover before finalizing the note.
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
## 2023-04-12 NOTE — CARE PLAN
The patient is Watcher - Medium risk of patient condition declining or worsening    Shift Goals  Clinical Goals: monitor sodium levels, VSS  Patient Goals: sleep  Family Goals: barbara    Progress made toward(s) clinical / shift goals:    Problem: Knowledge Deficit - Standard  Goal: Patient and family/care givers will demonstrate understanding of plan of care, disease process/condition, diagnostic tests and medications  Outcome: Progressing     Problem: Fall Risk  Goal: Patient will remain free from falls  Outcome: Progressing     Problem: Pain - Standard  Goal: Alleviation of pain or a reduction in pain to the patient’s comfort goal  Outcome: Progressing       Patient is not progressing towards the following goals:

## 2023-04-13 VITALS
WEIGHT: 169.31 LBS | HEIGHT: 67 IN | BODY MASS INDEX: 26.57 KG/M2 | TEMPERATURE: 98.1 F | HEART RATE: 86 BPM | OXYGEN SATURATION: 95 % | RESPIRATION RATE: 18 BRPM | DIASTOLIC BLOOD PRESSURE: 85 MMHG | SYSTOLIC BLOOD PRESSURE: 125 MMHG

## 2023-04-13 LAB
ALBUMIN SERPL BCP-MCNC: 3.4 G/DL (ref 3.2–4.9)
ALBUMIN/GLOB SERPL: 1.3 G/DL
ALP SERPL-CCNC: 73 U/L (ref 30–99)
ALT SERPL-CCNC: 42 U/L (ref 2–50)
ANION GAP SERPL CALC-SCNC: 12 MMOL/L (ref 7–16)
AST SERPL-CCNC: 43 U/L (ref 12–45)
BILIRUB SERPL-MCNC: 0.2 MG/DL (ref 0.1–1.5)
BUN SERPL-MCNC: 7 MG/DL (ref 8–22)
CALCIUM ALBUM COR SERPL-MCNC: 9.4 MG/DL (ref 8.5–10.5)
CALCIUM SERPL-MCNC: 8.9 MG/DL (ref 8.5–10.5)
CHLORIDE SERPL-SCNC: 99 MMOL/L (ref 96–112)
CO2 SERPL-SCNC: 21 MMOL/L (ref 20–33)
CREAT SERPL-MCNC: 0.41 MG/DL (ref 0.5–1.4)
ERYTHROCYTE [DISTWIDTH] IN BLOOD BY AUTOMATED COUNT: 48.6 FL (ref 35.9–50)
GFR SERPLBLD CREATININE-BSD FMLA CKD-EPI: 111 ML/MIN/1.73 M 2
GLOBULIN SER CALC-MCNC: 2.7 G/DL (ref 1.9–3.5)
GLUCOSE SERPL-MCNC: 101 MG/DL (ref 65–99)
HCT VFR BLD AUTO: 33 % (ref 37–47)
HGB BLD-MCNC: 11.3 G/DL (ref 12–16)
MAGNESIUM SERPL-MCNC: 1.6 MG/DL (ref 1.5–2.5)
MCH RBC QN AUTO: 33.4 PG (ref 27–33)
MCHC RBC AUTO-ENTMCNC: 34.2 G/DL (ref 33.6–35)
MCV RBC AUTO: 97.6 FL (ref 81.4–97.8)
PHOSPHATE SERPL-MCNC: 4.4 MG/DL (ref 2.5–4.5)
PLATELET # BLD AUTO: 267 K/UL (ref 164–446)
PMV BLD AUTO: 9.8 FL (ref 9–12.9)
POTASSIUM SERPL-SCNC: 4.1 MMOL/L (ref 3.6–5.5)
PROT SERPL-MCNC: 6.1 G/DL (ref 6–8.2)
RBC # BLD AUTO: 3.38 M/UL (ref 4.2–5.4)
SODIUM SERPL-SCNC: 132 MMOL/L (ref 135–145)
SODIUM SERPL-SCNC: 133 MMOL/L (ref 135–145)
WBC # BLD AUTO: 10.7 K/UL (ref 4.8–10.8)

## 2023-04-13 PROCEDURE — 85027 COMPLETE CBC AUTOMATED: CPT

## 2023-04-13 PROCEDURE — A9270 NON-COVERED ITEM OR SERVICE: HCPCS | Performed by: INTERNAL MEDICINE

## 2023-04-13 PROCEDURE — 84100 ASSAY OF PHOSPHORUS: CPT

## 2023-04-13 PROCEDURE — 700102 HCHG RX REV CODE 250 W/ 637 OVERRIDE(OP): Performed by: INTERNAL MEDICINE

## 2023-04-13 PROCEDURE — 700102 HCHG RX REV CODE 250 W/ 637 OVERRIDE(OP): Performed by: HOSPITALIST

## 2023-04-13 PROCEDURE — 83735 ASSAY OF MAGNESIUM: CPT

## 2023-04-13 PROCEDURE — A9270 NON-COVERED ITEM OR SERVICE: HCPCS | Performed by: STUDENT IN AN ORGANIZED HEALTH CARE EDUCATION/TRAINING PROGRAM

## 2023-04-13 PROCEDURE — A9270 NON-COVERED ITEM OR SERVICE: HCPCS | Performed by: HOSPITALIST

## 2023-04-13 PROCEDURE — 700102 HCHG RX REV CODE 250 W/ 637 OVERRIDE(OP): Performed by: STUDENT IN AN ORGANIZED HEALTH CARE EDUCATION/TRAINING PROGRAM

## 2023-04-13 PROCEDURE — 99239 HOSP IP/OBS DSCHRG MGMT >30: CPT | Performed by: HOSPITALIST

## 2023-04-13 PROCEDURE — 80053 COMPREHEN METABOLIC PANEL: CPT

## 2023-04-13 PROCEDURE — 84295 ASSAY OF SERUM SODIUM: CPT

## 2023-04-13 PROCEDURE — 36415 COLL VENOUS BLD VENIPUNCTURE: CPT

## 2023-04-13 RX ADMIN — ESCITALOPRAM OXALATE 20 MG: 10 TABLET ORAL at 04:59

## 2023-04-13 RX ADMIN — SENNOSIDES AND DOCUSATE SODIUM 2 TABLET: 50; 8.6 TABLET ORAL at 04:59

## 2023-04-13 RX ADMIN — THERA TABS 1 TABLET: TAB at 04:59

## 2023-04-13 NOTE — PROGRESS NOTES
4 Eyes Skin Assessment Completed by REID Mejia and REID Coppola.    Head Bruising, Left eye- healing  Ears Redness and Blanching  Nose WDL  Mouth WDL  Neck WDL  Breast/Chest WDL  Shoulder Blades WDL  Spine WDL  (R) Arm/Elbow/Hand Redness, Blanching, Abrasion, and Discoloration  (L) Arm/Elbow/Hand Redness, Blanching, Abrasion, and Scab  Abdomen WDL  Groin WDL  Scrotum/Coccyx/Buttocks Redness and Blanching  (R) Leg Redness, Blanching, Scab, Bruising, and Abrasion  (L) Leg Redness, Blanching, Scab, Bruising, and Abrasion  (R) Heel/Foot/Toe Redness, Blanching, and Boggy  (L) Heel/Foot/Toe Redness, Blanching, and Boggy          Devices In Places Blood Pressure Cuff      Interventions In Place Pillows and Pressure Redistribution Mattress    Possible Skin Injury No    Pictures Uploaded Into Epic N/A  Wound Consult Placed N/A  RN Wound Prevention Protocol Ordered No

## 2023-04-13 NOTE — FACE TO FACE
Face to Face Note  -  Durable Medical Equipment    Daniel Sorenson M.D. - NPI: [id number]  I certify that this patient is under my care and that they have had a durable medical equipment(DME)face to face encounter by myself that meets the physician DME face-to-face encounter requirements with this patient on:    Date of encounter:   Patient:                    MRN:                       YOB: 2023  Farzana Fragoso  [id number]  1960     The encounter with the patient was in whole, or in part, for the following medical condition, which is the primary reason for durable medical equipment:  Other - metabolic myopathy    I certify that, based on my findings, the following durable medical equipment is medically necessary:  Walkers.        ------------------------------------------------------------------------------------------------------------------    Face to Face Supporting Documentation - Home Health    The encounter with this patient was in whole or in part the primary reason for home health admission.    Date of encounter:   Patient:                    MRN:                       YOB: 2023  Farzana Fragoso  [id number]  1960     Home health to see patient for:  Skilled Nursing care for assessment, interventions & education and Physical Therapy evaluation and treatment    Skilled need for:  New Onset Medical Diagnosis Metabolic myopathy, gait difficulty    Skilled nursing interventions to include:  Comment: Gait training, stability checks    Homebound evidenced status by:  Needs the assistance of another person in order to leave the home. Leaving home must require a considerable and taxing effort. There must exist a normal inability to leave the home.    Community Physician to provide follow up care: Pcp Pt States None     Optional Interventions    Wound information & treatment:    Home Infusion Therapy orders:    Line/Drain/Airway:    I certify the face to face  encounter for this home care referral meets the CMS requirements and the encounter/clinical assessment with the patient was, in whole, or in part, for the medical condition(s) listed above, which is the primary reason for home health care. Based on my clinical findings: the service(s) are medically necessary, support the need for home health care, and the homebound criteria are met.  I certify that this patient has had a face to face encounter by myself.  Daniel Sorenson M.D. - NPI: [id number]    *Debility, frailty and advanced age in the absence of an acute deterioration or exacerbation of a condition do not qualify a patient for home health.

## 2023-04-13 NOTE — DISCHARGE PLANNING
Care Transition Team Final Discharge Disposition    Actual Discharge Information  Discharge Disposition: D/T to home under [facility] care in anticipation of covered skilled care (06)    [facility]    CM provided patient with taxi voucher # [id number] home as she has no transportation at time she is ready to discharge.    PCP Dr. Sumeet Rivera MD- CM updated patient chart to forward discharge summary to PCP.    No other CM needs at this time.

## 2023-04-13 NOTE — CARE PLAN
The patient is Stable - Low risk of patient condition declining or worsening    Shift Goals  Clinical Goals: Safety, monitor labs, rest  Patient Goals: rest  Family Goals: barbara      Problem: Knowledge Deficit - Standard  Goal: Patient and family/care givers will demonstrate understanding of plan of care, disease process/condition, diagnostic tests and medications  Outcome: Progressing     Problem: Fall Risk  Goal: Patient will remain free from falls  Outcome: Progressing     Problem: Pain - Standard  Goal: Alleviation of pain or a reduction in pain to the patient’s comfort goal  Outcome: Progressing       Progress made toward(s) clinical / shift goals:    Patient is alert and oreinted x 4. No complaint of pain. Reza to the floor. Oriented to the room and plan of care. Monitoring labs. FWW with standby assist to the toilet. 4 eyes skin check done, no open areas. Generalized bruising/abrasion from previous fall. Bed in lowest position, bed locked, bed alarm. Call light and personal items within reached. Kept safe and monitored.     Patient is not progressing towards the following goals:

## 2023-04-13 NOTE — PROGRESS NOTES
Went over all discharge instructions with pt who voiced understanding. Paper work done and IV removed. CNA took pt downstairs to get a cab

## 2023-04-13 NOTE — DISCHARGE SUMMARY
Discharge Summary    CHIEF COMPLAINT ON ADMISSION  Chief Complaint   Patient presents with    GLF       Reason for Admission  Weakness, altered mental status, severe hyponatremia    Admission Date  4/8/2023    CODE STATUS  Full Code    HPI & HOSPITAL COURSE  62 y.o. female who presented 4/8/2023 with altered mental status.  Ms. Fragoso has no present no medical conditions no takes Lexapro for mild depression that sustained a fall in bed was too weak to get up.  Her friend came by and found her on the floor.  Upon questioning her friend who is at bedside states that patient had been texting her and they were illegible texts.  She called paramedics and in the emergency room she was found to have a sodium of 102 and repeat was 101.  She was admitted to the intensive care unit on half-normal saline.  She was also found to be in rhabdomyolysis and her CPKs were trended  The patient improved with correction of her sodium levels and time, she admits to it likely psychogenic excessive water intake, she was counseled and she is willing to correct her behavior.  Her CPK levels decreased significantly  The patient was prescribed a walker and was referred to home health for ongoing assistance and PT, gait training.  The patient was found to have a incidental lesion on chest x-ray in her right shoulder, a follow-up x-ray shows a convex lesion at the medial aspect of right proximal humeral shaft most likely a sessile osteochondroma  Her other imaging including a CT C-spine, CT head was otherwise unremarkable.  The patient was much improved, she felt stable and good enough to go home with close outpatient follow-up    Therefore, she is discharged in fair and stable condition to home with close outpatient follow-up.    The patient met 2-midnight criteria for an inpatient stay at the time of discharge.    Discharge Date  4/13/2023    FOLLOW UP ITEMS POST DISCHARGE  Suggested to have a follow-up BMP when she visits her primary care  physician within the next 2 weeks    DISCHARGE DIAGNOSES  Principal Problem:    Hyponatremia POA: Yes  Active Problems:    Rhabdomyolysis POA: Yes    Hypokalemia POA: Yes    Hypomagnesemia POA: Yes    Ground-level fall POA: Yes    Lytic bone lesions on xray POA: Yes    Encephalopathy, metabolic POA: Yes  Resolved Problems:    * No resolved hospital problems. *      FOLLOW UP    [facility]  [address]  [place] [id number] [phone]  Follow up  They will call you to set up time to start services    Sumeet Rivera M.D.  [address] [facility]  [address]  [place] [id number] [phone]    Schedule an appointment as soon as possible for a visit in 1 week(s)        MEDICATIONS ON DISCHARGE     Medication List        CONTINUE taking these medications        Instructions   albuterol 108 (90 Base) MCG/ACT Aers inhalation aerosol   Inhale 2 Puffs every four hours as needed for Shortness of Breath.  Dose: 2 Puff     aspirin 325 MG Tabs  Commonly known as: ASA   Take 650 mg by mouth every 6 hours as needed.  Dose: 650 mg     budesonide-formoterol 160-4.5 MCG/ACT Aero  Commonly known as: SYMBICORT   Inhale 2 Puffs 2 times a day.  Dose: 2 Puff     escitalopram 20 MG tablet  Commonly known as: LEXAPRO   Take 20 mg by mouth every day.  Dose: 20 mg              Allergies  No Known Allergies    DIET  Orders Placed This Encounter   Procedures    Diet Order Diet: Regular; Fluid modifications: (optional): 1800 ml Fluid Restriction     Standing Status:   Standing     Number of Occurrences:   1     Order Specific Question:   Diet:     Answer:   Regular [1]     Order Specific Question:   Fluid modifications: (optional)     Answer:   1800 ml Fluid Restriction [10]       ACTIVITY  As tolerated.  Weight bearing as tolerated    CONSULTATIONS  Critical care    PROCEDURES  None    LABORATORY  Lab Results   Component Value Date    SODIUM 133 (L) 04/13/2023    POTASSIUM 4.1 04/13/2023    CHLORIDE 99 04/13/2023    CO2 21  04/13/2023    GLUCOSE 101 (H) 04/13/2023    BUN 7 (L) 04/13/2023    CREATININE 0.41 (L) 04/13/2023        Lab Results   Component Value Date    WBC 10.7 04/13/2023    HEMOGLOBIN 11.3 (L) 04/13/2023    HEMATOCRIT 33.0 (L) 04/13/2023    PLATELETCT 267 04/13/2023        Total time of the discharge process to the extents of 45 minutes

## 2023-04-13 NOTE — CARE PLAN
The patient is Watcher - Medium risk of patient condition declining or worsening    Shift Goals  Clinical Goals: Na 135>  Patient Goals: Discharge  Family Goals: ABIGAIL    Progress made toward(s) clinical / shift goals:    Problem: Knowledge Deficit - Standard  Goal: Patient and family/care givers will demonstrate understanding of plan of care, disease process/condition, diagnostic tests and medications  Outcome: Progressing     Problem: Fall Risk  Goal: Patient will remain free from falls  Outcome: Progressing     Problem: Pain - Standard  Goal: Alleviation of pain or a reduction in pain to the patient’s comfort goal  Outcome: Progressing       Patient is not progressing towards the following goals:

## 2023-04-13 NOTE — PROGRESS NOTES
Assumed care of patient 0700. Received Report from [facility] nurse. Patient A&O 4  , on ra, Reporting a pain level of  0. Call light within reach, belongings within reach, Fall precautions in place, and bed alarm is on and bed in lowest position. Patient does not have any other needs at this time.

## 2025-03-03 ENCOUNTER — PATIENT MESSAGE (OUTPATIENT)
Dept: HEALTH INFORMATION MANAGEMENT | Facility: OTHER | Age: 65
End: 2025-03-03